# Patient Record
Sex: FEMALE | Race: BLACK OR AFRICAN AMERICAN | NOT HISPANIC OR LATINO
[De-identification: names, ages, dates, MRNs, and addresses within clinical notes are randomized per-mention and may not be internally consistent; named-entity substitution may affect disease eponyms.]

---

## 2017-02-07 ENCOUNTER — APPOINTMENT (OUTPATIENT)
Dept: OBGYN | Facility: CLINIC | Age: 47
End: 2017-02-07

## 2017-02-07 VITALS
SYSTOLIC BLOOD PRESSURE: 118 MMHG | HEART RATE: 102 BPM | HEIGHT: 63 IN | DIASTOLIC BLOOD PRESSURE: 83 MMHG | BODY MASS INDEX: 32.96 KG/M2 | WEIGHT: 186 LBS

## 2017-02-07 DIAGNOSIS — D21.9 BENIGN NEOPLASM OF CONNECTIVE AND OTHER SOFT TISSUE, UNSPECIFIED: ICD-10-CM

## 2017-02-08 LAB — HPV HIGH+LOW RISK DNA PNL CVX: NEGATIVE

## 2017-02-12 LAB — CYTOLOGY CVX/VAG DOC THIN PREP: NORMAL

## 2017-02-21 ENCOUNTER — APPOINTMENT (OUTPATIENT)
Dept: OBGYN | Facility: CLINIC | Age: 47
End: 2017-02-21

## 2017-02-21 VITALS
HEART RATE: 97 BPM | SYSTOLIC BLOOD PRESSURE: 142 MMHG | DIASTOLIC BLOOD PRESSURE: 91 MMHG | BODY MASS INDEX: 32.96 KG/M2 | HEIGHT: 63 IN | WEIGHT: 186 LBS

## 2017-02-23 LAB — CORE LAB BIOPSY: NORMAL

## 2017-03-08 ENCOUNTER — OUTPATIENT (OUTPATIENT)
Dept: OUTPATIENT SERVICES | Facility: HOSPITAL | Age: 47
LOS: 1 days | End: 2017-03-08
Payer: COMMERCIAL

## 2017-03-08 ENCOUNTER — APPOINTMENT (OUTPATIENT)
Dept: MAMMOGRAPHY | Facility: IMAGING CENTER | Age: 47
End: 2017-03-08

## 2017-03-08 ENCOUNTER — APPOINTMENT (OUTPATIENT)
Dept: ULTRASOUND IMAGING | Facility: IMAGING CENTER | Age: 47
End: 2017-03-08

## 2017-03-08 DIAGNOSIS — Z98.89 OTHER SPECIFIED POSTPROCEDURAL STATES: Chronic | ICD-10-CM

## 2017-03-08 DIAGNOSIS — Z00.8 ENCOUNTER FOR OTHER GENERAL EXAMINATION: ICD-10-CM

## 2017-04-13 PROCEDURE — 76642 ULTRASOUND BREAST LIMITED: CPT

## 2017-04-13 PROCEDURE — G0279: CPT

## 2017-04-13 PROCEDURE — 77065 DX MAMMO INCL CAD UNI: CPT

## 2017-09-07 ENCOUNTER — OUTPATIENT (OUTPATIENT)
Dept: OUTPATIENT SERVICES | Facility: HOSPITAL | Age: 47
LOS: 1 days | End: 2017-09-07
Payer: COMMERCIAL

## 2017-09-07 ENCOUNTER — APPOINTMENT (OUTPATIENT)
Dept: ULTRASOUND IMAGING | Facility: IMAGING CENTER | Age: 47
End: 2017-09-07
Payer: COMMERCIAL

## 2017-09-07 ENCOUNTER — APPOINTMENT (OUTPATIENT)
Dept: MAMMOGRAPHY | Facility: IMAGING CENTER | Age: 47
End: 2017-09-07
Payer: COMMERCIAL

## 2017-09-07 DIAGNOSIS — Z98.89 OTHER SPECIFIED POSTPROCEDURAL STATES: Chronic | ICD-10-CM

## 2017-09-07 DIAGNOSIS — Z00.8 ENCOUNTER FOR OTHER GENERAL EXAMINATION: ICD-10-CM

## 2017-09-07 PROCEDURE — 77066 DX MAMMO INCL CAD BI: CPT

## 2017-09-07 PROCEDURE — 76641 ULTRASOUND BREAST COMPLETE: CPT | Mod: 26,50

## 2017-09-07 PROCEDURE — 76641 ULTRASOUND BREAST COMPLETE: CPT

## 2017-09-07 PROCEDURE — G0279: CPT | Mod: 26

## 2017-09-07 PROCEDURE — G0204: CPT | Mod: 26

## 2017-09-07 PROCEDURE — G0279: CPT

## 2017-09-26 ENCOUNTER — APPOINTMENT (OUTPATIENT)
Dept: ULTRASOUND IMAGING | Facility: IMAGING CENTER | Age: 47
End: 2017-09-26

## 2017-09-26 ENCOUNTER — APPOINTMENT (OUTPATIENT)
Dept: MAMMOGRAPHY | Facility: IMAGING CENTER | Age: 47
End: 2017-09-26

## 2018-04-12 ENCOUNTER — APPOINTMENT (OUTPATIENT)
Dept: OBGYN | Facility: CLINIC | Age: 48
End: 2018-04-12
Payer: COMMERCIAL

## 2018-04-12 VITALS
SYSTOLIC BLOOD PRESSURE: 116 MMHG | WEIGHT: 180 LBS | HEART RATE: 86 BPM | HEIGHT: 63 IN | DIASTOLIC BLOOD PRESSURE: 76 MMHG | BODY MASS INDEX: 31.89 KG/M2 | OXYGEN SATURATION: 98 %

## 2018-04-12 DIAGNOSIS — R03.0 ELEVATED BLOOD-PRESSURE READING, W/OUT DIAGNOSIS OF HYPERTENSION: ICD-10-CM

## 2018-04-12 LAB
HCG UR QL: NEGATIVE
QUALITY CONTROL: YES

## 2018-04-12 PROCEDURE — 99396 PREV VISIT EST AGE 40-64: CPT

## 2018-04-13 LAB — HPV HIGH+LOW RISK DNA PNL CVX: NOT DETECTED

## 2018-04-16 LAB — CYTOLOGY CVX/VAG DOC THIN PREP: NORMAL

## 2018-06-02 ENCOUNTER — EMERGENCY (EMERGENCY)
Facility: HOSPITAL | Age: 48
LOS: 1 days | Discharge: HOME | End: 2018-06-02
Attending: EMERGENCY MEDICINE | Admitting: EMERGENCY MEDICINE
Payer: COMMERCIAL

## 2018-06-02 VITALS
RESPIRATION RATE: 16 BRPM | TEMPERATURE: 98 F | DIASTOLIC BLOOD PRESSURE: 86 MMHG | SYSTOLIC BLOOD PRESSURE: 134 MMHG | HEART RATE: 95 BPM | OXYGEN SATURATION: 100 %

## 2018-06-02 DIAGNOSIS — Z98.89 OTHER SPECIFIED POSTPROCEDURAL STATES: Chronic | ICD-10-CM

## 2018-06-02 PROCEDURE — 99284 EMERGENCY DEPT VISIT MOD MDM: CPT

## 2018-06-02 PROCEDURE — 70450 CT HEAD/BRAIN W/O DYE: CPT | Mod: 26

## 2018-06-02 RX ORDER — ACETAMINOPHEN 500 MG
650 TABLET ORAL ONCE
Qty: 0 | Refills: 0 | Status: COMPLETED | OUTPATIENT
Start: 2018-06-02 | End: 2018-06-02

## 2018-06-02 RX ADMIN — Medication 650 MILLIGRAM(S): at 15:46

## 2018-06-02 NOTE — ED PROVIDER NOTE - PSH
Ectopic Pregnancy  Left Salpingectomy 2007  H/O arthroscopic knee surgery    History of Dilatation and Curettage  x 3  S/P Breast Biopsy, Bilateral    S/P Breast Lumpectomy  left 2009

## 2018-06-02 NOTE — ED ADULT TRIAGE NOTE - CHIEF COMPLAINT QUOTE
states" I got hurt from  yesterday on [address] while at work." c/o swelling to left fore head. denies LOC. c/o pain to head. 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 denies use of blood thinners. takes baby ASA daily

## 2018-06-02 NOTE — ED PROVIDER NOTE - ENMT, MLM
Airway patent, Nasal mucosa clear. Mouth with normal mucosa. Throat has no vesicles, no oropharyngeal exudates and uvula is midline. No facial tenderness

## 2018-06-02 NOTE — ED PROVIDER NOTE - MEDICAL DECISION MAKING DETAILS
Based on exam and hx do not suspect serious intercranial hemorrhage or injury. Likely concussed but pt very worried. Will get CT scan. Pt has irregular periods last period in February. UCG and give tylenol

## 2018-06-02 NOTE — ED PROVIDER NOTE - PMH
Ectopic Pregnancy    HTN (Hypertension)    Hyperaldosteronism    Hypothyroidism    Malignant neoplasm of left female breast, unspecified site of breast  2009, s/p chemo, radiation, surgery - on tamoxifen  Obesity    Polyp of Corpus Uteri

## 2018-09-10 ENCOUNTER — OUTPATIENT (OUTPATIENT)
Dept: OUTPATIENT SERVICES | Facility: HOSPITAL | Age: 48
LOS: 1 days | End: 2018-09-10
Payer: COMMERCIAL

## 2018-09-10 ENCOUNTER — APPOINTMENT (OUTPATIENT)
Dept: MAMMOGRAPHY | Facility: IMAGING CENTER | Age: 48
End: 2018-09-10
Payer: COMMERCIAL

## 2018-09-10 ENCOUNTER — APPOINTMENT (OUTPATIENT)
Dept: ULTRASOUND IMAGING | Facility: IMAGING CENTER | Age: 48
End: 2018-09-10
Payer: COMMERCIAL

## 2018-09-10 DIAGNOSIS — Z98.89 OTHER SPECIFIED POSTPROCEDURAL STATES: Chronic | ICD-10-CM

## 2018-09-10 DIAGNOSIS — Z00.8 ENCOUNTER FOR OTHER GENERAL EXAMINATION: ICD-10-CM

## 2018-09-10 PROCEDURE — 77063 BREAST TOMOSYNTHESIS BI: CPT | Mod: 26

## 2018-09-10 PROCEDURE — 76641 ULTRASOUND BREAST COMPLETE: CPT | Mod: 26,50

## 2018-09-10 PROCEDURE — 77063 BREAST TOMOSYNTHESIS BI: CPT

## 2018-09-10 PROCEDURE — 77067 SCR MAMMO BI INCL CAD: CPT

## 2018-09-10 PROCEDURE — 77067 SCR MAMMO BI INCL CAD: CPT | Mod: 26

## 2018-09-10 PROCEDURE — 76641 ULTRASOUND BREAST COMPLETE: CPT

## 2019-04-12 ENCOUNTER — APPOINTMENT (OUTPATIENT)
Dept: OBGYN | Facility: CLINIC | Age: 49
End: 2019-04-12
Payer: COMMERCIAL

## 2019-04-12 VITALS
HEIGHT: 63 IN | DIASTOLIC BLOOD PRESSURE: 70 MMHG | OXYGEN SATURATION: 98 % | BODY MASS INDEX: 30.48 KG/M2 | SYSTOLIC BLOOD PRESSURE: 114 MMHG | WEIGHT: 172 LBS | HEART RATE: 86 BPM

## 2019-04-12 DIAGNOSIS — R92.2 INCONCLUSIVE MAMMOGRAM: ICD-10-CM

## 2019-04-12 DIAGNOSIS — Z12.31 ENCOUNTER FOR SCREENING MAMMOGRAM FOR MALIGNANT NEOPLASM OF BREAST: ICD-10-CM

## 2019-04-12 DIAGNOSIS — Z12.11 ENCOUNTER FOR SCREENING FOR MALIGNANT NEOPLASM OF COLON: ICD-10-CM

## 2019-04-12 PROCEDURE — 99396 PREV VISIT EST AGE 40-64: CPT

## 2019-04-12 NOTE — HISTORY OF PRESENT ILLNESS
[Good] : being in good health [HPV Vaccine NA Due to Age] : HPV vaccine not available to patient due to age [Perimenopausal] : is perimenopausal

## 2019-04-12 NOTE — PHYSICAL EXAM
[Awake] : awake [Alert] : alert [Acute Distress] : no acute distress [Mass] : no breast mass [Nipple Discharge] : no nipple discharge [Soft] : soft [Axillary LAD] : no axillary lymphadenopathy [Tender] : non tender [Oriented x3] : oriented to person, place, and time [Normal] : cervix [No Bleeding] : there was no active vaginal bleeding [Uterine Adnexae] : were not tender and not enlarged

## 2019-04-14 LAB — HPV HIGH+LOW RISK DNA PNL CVX: NOT DETECTED

## 2019-04-16 LAB — CYTOLOGY CVX/VAG DOC THIN PREP: NORMAL

## 2019-04-29 ENCOUNTER — OTHER (OUTPATIENT)
Age: 49
End: 2019-04-29

## 2019-06-19 ENCOUNTER — EMERGENCY (EMERGENCY)
Facility: HOSPITAL | Age: 49
LOS: 1 days | Discharge: HOME | End: 2019-06-19
Admitting: EMERGENCY MEDICINE
Payer: COMMERCIAL

## 2019-06-19 VITALS
OXYGEN SATURATION: 100 % | SYSTOLIC BLOOD PRESSURE: 122 MMHG | TEMPERATURE: 98 F | RESPIRATION RATE: 18 BRPM | HEART RATE: 80 BPM | DIASTOLIC BLOOD PRESSURE: 76 MMHG

## 2019-06-19 DIAGNOSIS — Z98.89 OTHER SPECIFIED POSTPROCEDURAL STATES: Chronic | ICD-10-CM

## 2019-06-19 LAB
ALBUMIN SERPL ELPH-MCNC: 4.2 G/DL — SIGNIFICANT CHANGE UP (ref 3.3–5)
ALP SERPL-CCNC: 82 U/L — SIGNIFICANT CHANGE UP (ref 40–120)
ALT FLD-CCNC: 17 U/L — SIGNIFICANT CHANGE UP (ref 4–33)
ANION GAP SERPL CALC-SCNC: 12 MMO/L — SIGNIFICANT CHANGE UP (ref 7–14)
AST SERPL-CCNC: 30 U/L — SIGNIFICANT CHANGE UP (ref 4–32)
BASOPHILS # BLD AUTO: 0.04 K/UL — SIGNIFICANT CHANGE UP (ref 0–0.2)
BASOPHILS NFR BLD AUTO: 0.7 % — SIGNIFICANT CHANGE UP (ref 0–2)
BILIRUB SERPL-MCNC: 0.6 MG/DL — SIGNIFICANT CHANGE UP (ref 0.2–1.2)
BUN SERPL-MCNC: 17 MG/DL — SIGNIFICANT CHANGE UP (ref 7–23)
CALCIUM SERPL-MCNC: 9.9 MG/DL — SIGNIFICANT CHANGE UP (ref 8.4–10.5)
CHLORIDE SERPL-SCNC: 100 MMOL/L — SIGNIFICANT CHANGE UP (ref 98–107)
CO2 SERPL-SCNC: 27 MMOL/L — SIGNIFICANT CHANGE UP (ref 22–31)
CREAT SERPL-MCNC: 0.97 MG/DL — SIGNIFICANT CHANGE UP (ref 0.5–1.3)
EOSINOPHIL # BLD AUTO: 0.22 K/UL — SIGNIFICANT CHANGE UP (ref 0–0.5)
EOSINOPHIL NFR BLD AUTO: 3.6 % — SIGNIFICANT CHANGE UP (ref 0–6)
GLUCOSE SERPL-MCNC: 105 MG/DL — HIGH (ref 70–99)
HCT VFR BLD CALC: 38.6 % — SIGNIFICANT CHANGE UP (ref 34.5–45)
HGB BLD-MCNC: 13 G/DL — SIGNIFICANT CHANGE UP (ref 11.5–15.5)
IMM GRANULOCYTES NFR BLD AUTO: 0.2 % — SIGNIFICANT CHANGE UP (ref 0–1.5)
LIDOCAIN IGE QN: 35.4 U/L — SIGNIFICANT CHANGE UP (ref 7–60)
LYMPHOCYTES # BLD AUTO: 3.28 K/UL — SIGNIFICANT CHANGE UP (ref 1–3.3)
LYMPHOCYTES # BLD AUTO: 54.2 % — HIGH (ref 13–44)
MCHC RBC-ENTMCNC: 32.7 PG — SIGNIFICANT CHANGE UP (ref 27–34)
MCHC RBC-ENTMCNC: 33.7 % — SIGNIFICANT CHANGE UP (ref 32–36)
MCV RBC AUTO: 97 FL — SIGNIFICANT CHANGE UP (ref 80–100)
MONOCYTES # BLD AUTO: 0.49 K/UL — SIGNIFICANT CHANGE UP (ref 0–0.9)
MONOCYTES NFR BLD AUTO: 8.1 % — SIGNIFICANT CHANGE UP (ref 2–14)
NEUTROPHILS # BLD AUTO: 2.01 K/UL — SIGNIFICANT CHANGE UP (ref 1.8–7.4)
NEUTROPHILS NFR BLD AUTO: 33.2 % — LOW (ref 43–77)
NRBC # FLD: 0 K/UL — SIGNIFICANT CHANGE UP (ref 0–0)
PLATELET # BLD AUTO: 285 K/UL — SIGNIFICANT CHANGE UP (ref 150–400)
PMV BLD: 9.1 FL — SIGNIFICANT CHANGE UP (ref 7–13)
POTASSIUM SERPL-MCNC: 3.7 MMOL/L — SIGNIFICANT CHANGE UP (ref 3.5–5.3)
POTASSIUM SERPL-SCNC: 3.7 MMOL/L — SIGNIFICANT CHANGE UP (ref 3.5–5.3)
PROT SERPL-MCNC: 7.4 G/DL — SIGNIFICANT CHANGE UP (ref 6–8.3)
RBC # BLD: 3.98 M/UL — SIGNIFICANT CHANGE UP (ref 3.8–5.2)
RBC # FLD: 12 % — SIGNIFICANT CHANGE UP (ref 10.3–14.5)
SODIUM SERPL-SCNC: 139 MMOL/L — SIGNIFICANT CHANGE UP (ref 135–145)
WBC # BLD: 6.05 K/UL — SIGNIFICANT CHANGE UP (ref 3.8–10.5)
WBC # FLD AUTO: 6.05 K/UL — SIGNIFICANT CHANGE UP (ref 3.8–10.5)

## 2019-06-19 PROCEDURE — 99284 EMERGENCY DEPT VISIT MOD MDM: CPT

## 2019-06-19 RX ORDER — FAMOTIDINE 10 MG/ML
20 INJECTION INTRAVENOUS ONCE
Refills: 0 | Status: COMPLETED | OUTPATIENT
Start: 2019-06-19 | End: 2019-06-19

## 2019-06-19 RX ORDER — SODIUM CHLORIDE 9 MG/ML
1000 INJECTION INTRAMUSCULAR; INTRAVENOUS; SUBCUTANEOUS ONCE
Refills: 0 | Status: COMPLETED | OUTPATIENT
Start: 2019-06-19 | End: 2019-06-19

## 2019-06-19 RX ORDER — ONDANSETRON 8 MG/1
4 TABLET, FILM COATED ORAL ONCE
Refills: 0 | Status: COMPLETED | OUTPATIENT
Start: 2019-06-19 | End: 2019-06-19

## 2019-06-19 RX ADMIN — SODIUM CHLORIDE 1000 MILLILITER(S): 9 INJECTION INTRAMUSCULAR; INTRAVENOUS; SUBCUTANEOUS at 16:35

## 2019-06-19 RX ADMIN — FAMOTIDINE 20 MILLIGRAM(S): 10 INJECTION INTRAVENOUS at 16:36

## 2019-06-19 RX ADMIN — ONDANSETRON 4 MILLIGRAM(S): 8 TABLET, FILM COATED ORAL at 16:35

## 2019-06-19 NOTE — ED ADULT TRIAGE NOTE - CHIEF COMPLAINT QUOTE
Pt co nausea since last night no vomiting. Pt also denies any abdominal pain or chest pain. finger stick 108 in triage.

## 2019-06-19 NOTE — ED ADULT NURSE REASSESSMENT NOTE - NS ED NURSE REASSESS COMMENT FT1
Awake and alert, IV placed, labs sent, medicated as ordered. Patient with nausea, no vomiting, no abdominal pain.

## 2019-06-19 NOTE — ED PROVIDER NOTE - CLINICAL SUMMARY MEDICAL DECISION MAKING FREE TEXT BOX
50 y/o female c/o nausea and vomiting x 2 days  -possible gastroenteritis vs gastritis   -labs, iv fluids, gi meds  -reassess

## 2019-06-29 NOTE — ED ADULT TRIAGE NOTE - BP NONINVASIVE DIASTOLIC (MM HG)
[facility]  Vascular Neurology  Comprehensive Stroke Center  Progress Note    Assessment/Plan:     * Thrombotic stroke involving right middle cerebral artery  55 y.o. female with significant past medical history of DM, HTN, HLD, left PCA territory infarcts last April with residual mild R sided weakness and dysarthria, presents to the ED with complains of worsening speech since around 3 pm yesterday and increasing R leg weakness that became apparent today and interferes with her ability to ambulate with her walker.    MRI with scattered foci within the right hemisphere primarily R centrum semi ovale extending to the basal ganglia w/ punctate foci in the L hemisphere. On exam left upper extremity pronator drift, left leg drift, and dysarthria. Left sided weakness more notable than right sided weakness. Etiology likely small vessel disease in this patient with multiple stroke risk factors.     Antithrombotics for secondary stroke prevention: Antiplatelets: Aspirin: 81 mg daily  Clopidogrel: 75 mg daily X 30 days     Statins for secondary stroke prevention and hyperlipidemia, if present:   Statins: Atorvastatin- 40 mg daily    Aggressive risk factor modification: HTN, Smoking, HLD, drug abuse     Rehab efforts: The patient has been evaluated by a stroke team provider and the therapy needs have been fully considered based off the presenting complaints and exam findings. The following therapy evaluations are needed: PT evaluate and treat, OT evaluate and treat, SLP evaluate and treat, PM&R evaluate for appropriate placement    Diagnostics ordered/pending: None     VTE prophylaxis: Heparin 5000 units SQ every 8 hours    BP parameters: Infarct: SBP <180        NICOLASA (acute kidney injury)  NICOLASA on admission   Initial Cr 4.2   Remains on fluids  Resolving, Cr 1.3 on 6/29    Cytotoxic cerebral edema  Area of cytotoxic cerebral edema identified when reviewing brain imaging in the territory of the R middle  cerebral artery. There is not mass effect associated with it. We will continue to monitor the patients clinical exam for any worsening of symptoms which may indicate expansion of the stroke or the area of the edema resulting in the clinical change. The pattern is suggestive of small vessel etiology      Cocaine abuse  Stroke risk factor  Tox screen last admission positive for cocaine - resources given at discharge  Pt states she no longer uses  Tox screen negative this admission     Mixed hyperlipidemia  Stroke risk factor  LDL 84.6  Continue Atorvastatin 40 mg   Per chart review questionable medication compliance     Essential hypertension  Stroke risk factor   SBP <180   BP at goal    Uncontrolled type 2 diabetes mellitus with hyperglycemia  Stroke risk factor  Hgb A1C > 14  Endocrine consult, appreciate assistance  Currently on transition insulin gtt           6/28 - Patient with generalized weakness all extremities; decreased left hand  strength. Hgb A1C >14. Endocrine consulted and patient placed on Insulin gtt. Creatinine trending down 4.2 -->3.4 - continuing IV fluids.   6/29 - NICOLASA improving, remains on insulin gtt    STROKE DOCUMENTATION   Acute Stroke Times   Last Known Normal Date: 06/26/19  Last Known Normal Time: 1500  Symptom Onset Date: 06/26/19  Symptom Onset Time: 1500  Stroke Team Called Date: 06/27/19  Stroke Team Called Time: 1520  Stroke Team Arrival Date: 06/27/19  Stroke Team Arrival Time: 1530  CT Interpretation Time: (CT pending)  Decision to Treat Time for Alteplase: (No iv alteplase candidate)  Decision to Treat Time for IR: (No IR candidate)    NIH Scale:  1a. Level of Consciousness: 0-->Alert, keenly responsive  1b. LOC Questions: 0-->Answers both questions correctly  1c. LOC Commands: 0-->Performs both tasks correctly  2. Best Gaze: 0-->Normal  3. Visual: 0-->No visual loss  4. Facial Palsy: 1-->Minor paralysis (flattened nasolabial fold, asymmetry on smiling)  5a. Motor Arm, Left:  1-->Drift, limb holds 90 (or 45) degrees, but drifts down before full 10 seconds, does not hit bed or other support  5b. Motor Arm, Right: 0-->No drift, limb holds 90 (or 45) degrees for full 10 secs  6a. Motor Leg, Left: 1-->Drift, leg falls by the end of the 5-sec period but does not hit bed  6b. Motor Leg, Right: 0-->No drift, leg holds 30 degree position for full 5 secs  7. Limb Ataxia: 0-->Absent  8. Sensory: 0-->Normal, no sensory loss  9. Best Language: 0-->No aphasia, normal  10. Dysarthria: 1-->Mild-to-moderate dysarthria, patient slurs at least some words and, at worst, can be understood with some difficulty  11. Extinction and Inattention (formerly Neglect): 0-->No abnormality  Total (NIH Stroke Scale): 4       Modified Geronimo Score: 3  Firth Coma Scale:    ABCD2 Score:    [id number] Score:   HAS -BLED Score:   ICH Score:   Hunt & Mueller Classification:      Hemorrhagic change of an Ischemic Stroke: Does this patient have an ischemic stroke with hemorrhagic changes? No     Neurologic Chief Complaint: right sided weakness     Subjective:     Interval History: Patient is seen for follow-up neurological assessment and treatment recommendations: BENIEON, no new complaints    HPI, Past Medical, Family, and Social History remains the same as documented in the initial encounter.     Review of Systems   Constitutional: Negative for fever.   Respiratory: Negative for shortness of breath.    Cardiovascular: Negative for chest pain.   Gastrointestinal: Negative for nausea and vomiting.   Neurological: Positive for speech difficulty and weakness.     Scheduled Meds:   aspirin  81 mg Oral Daily    atorvastatin  40 mg Oral Daily    clopidogrel  75 mg Oral Daily    insulin aspart U-100  10 Units Subcutaneous TIDWM    nicotine  1 patch Transdermal Daily     Continuous Infusions:   sodium chloride 0.9% 75 mL/hr at 06/29/19 0825    insulin (HUMAN R) infusion (adults) 1.5 Units/hr (06/29/19 1011)    sodium chloride  0.9%       PRN Meds:Dextrose 10% Bolus, Dextrose 10% Bolus, glucagon (human recombinant), glucose, glucose, insulin aspart U-100, sodium chloride 0.9%, sodium chloride 0.9%    Objective:     Vital Signs (Most Recent):  Temp: 97.8 °F (36.6 °C) (06/29/19 0756)  Pulse: 76 (06/29/19 0756)  Resp: 18 (06/29/19 0756)  BP: 130/66 (06/29/19 0756)  SpO2: 96 % (06/29/19 0756)  BP Location: Right arm    Vital Signs Range (Last 24H):  Temp:  [97.2 °F (36.2 °C)-98.6 °F (37 °C)]   Pulse:  [65-88]   Resp:  [18-20]   BP: (126-154)/(66-94)   SpO2:  [93 %-96 %]   BP Location: Right arm    Physical Exam   Constitutional: She is oriented to person, place, and time. She appears well-developed and well-nourished. No distress.   HENT:   Head: Normocephalic and atraumatic.   Eyes: Pupils are equal, round, and reactive to light. EOM are normal.   Cardiovascular: Normal rate.   Pulmonary/Chest: Effort normal.   Neurological: She is alert and oriented to person, place, and time.   Skin: Skin is warm and dry.   Vitals reviewed.      Neurological Exam:   LOC: alert  Attention Span: Good   Language: No aphasia  Articulation: Dysarthria  Orientation: Person, Place, Time   Visual Fields: Full  EOM (CN III, IV, VI): Full/intact  Pupils (CN II, III): PERRL  Facial Movement (CN VII): Lower facial weakness on the Left  Motor: Arm left  Paresis: 4/5  Leg left  Paresis: 4/5  Arm right  Normal 5/5  Leg right Normal 5/5  Cebellar: No evidence of appendicular or axial ataxia  Sensation: Intact to light touch, temperature and vibration    Laboratory:  CMP:   Recent Labs   Lab 06/29/19  0656   CALCIUM 10.1   ALBUMIN 3.0*   PROT 6.3      K 3.8   CO2 29      BUN 17   CREATININE 1.3   ALKPHOS 80   ALT 12   AST 18   BILITOT 0.3     BMP:   Recent Labs   Lab 06/29/19  0656      K 3.8      CO2 29   BUN 17   CREATININE 1.3   CALCIUM 10.1     CBC:   Recent Labs   Lab 06/29/19  0656   WBC 7.05   RBC 4.51   HGB 12.9   HCT 37.7      MCV 84    MCH 28.6   MCHC 34.2     Lipid Panel:   Recent Labs   Lab 06/27/19  1520   CHOL 176   LDLCALC 84.6   HDL 56   TRIG 177*     Coagulation:   Recent Labs   Lab 06/28/19  0608   INR 0.9   APTT 21.7     Platelet Aggregation Study: No results for input(s): PLTAGG, PLTAGINTERP, PLTAGREGLACO, ADPPLTAGGREG in the last 168 hours.  Hgb A1C:   Recent Labs   Lab 06/27/19  1520   HGBA1C >14.0*     TSH:   Recent Labs   Lab 06/27/19  1520   TSH 0.908       Diagnostic Results     Brain Imaging   MRI brain 6/28  Interval development of scattered foci of new signal abnormality most compatible with acute/recent infarcts as detailed above.  Largest focus in the right centrum semiovale extending to the basal ganglia with punctate foci within the left centrum semiovale and left posterior temporal occipital periventricular white matter.    Probable evolving infarcts bilateral basal ganglia with superimposed T2 flair signal abnormality elsewhere supratentorial white matter and vega suggestive for underlying chronic microvascular ischemic change.    Small remote left cerebellar infarction again seen.    No evidence for hemorrhagic conversion or hydrocephalus.  Clinical correlation and follow-up advised.      Vessel Imaging   MRA H/N  Significantly limited examination secondary to patient motion artifact.  No significant arterial abnormalities within the confines of the study.  Please consider CTA of the head and neck for further evaluation.    Cardiac Imaging   Echo 4/2/19 (previous admission)  · Normal left ventricular systolic function. The estimated ejection fraction is 68%  · Concentric left ventricular remodeling.  · Normal LV diastolic function.  · No wall motion abnormalities.  · Normal right ventricular systolic function.  · Mild mitral sclerosis.  · Normal central venous pressure (3 mm Hg).  · The estimated PA systolic pressure is 15 mm Hg                 Elvia Vann PA-C  Comprehensive Stroke Center  Department of Vascular  Neurology   [facility]       76

## 2019-09-16 ENCOUNTER — OUTPATIENT (OUTPATIENT)
Dept: OUTPATIENT SERVICES | Facility: HOSPITAL | Age: 49
LOS: 1 days | End: 2019-09-16
Payer: COMMERCIAL

## 2019-09-16 ENCOUNTER — APPOINTMENT (OUTPATIENT)
Dept: MAMMOGRAPHY | Facility: IMAGING CENTER | Age: 49
End: 2019-09-16
Payer: COMMERCIAL

## 2019-09-16 ENCOUNTER — APPOINTMENT (OUTPATIENT)
Dept: ULTRASOUND IMAGING | Facility: IMAGING CENTER | Age: 49
End: 2019-09-16
Payer: COMMERCIAL

## 2019-09-16 DIAGNOSIS — Z98.89 OTHER SPECIFIED POSTPROCEDURAL STATES: Chronic | ICD-10-CM

## 2019-09-16 DIAGNOSIS — C50.912 MALIGNANT NEOPLASM OF UNSPECIFIED SITE OF LEFT FEMALE BREAST: ICD-10-CM

## 2019-09-16 PROCEDURE — 77063 BREAST TOMOSYNTHESIS BI: CPT

## 2019-09-16 PROCEDURE — 76641 ULTRASOUND BREAST COMPLETE: CPT

## 2019-09-16 PROCEDURE — 77063 BREAST TOMOSYNTHESIS BI: CPT | Mod: 26

## 2019-09-16 PROCEDURE — 77067 SCR MAMMO BI INCL CAD: CPT

## 2019-09-16 PROCEDURE — 76641 ULTRASOUND BREAST COMPLETE: CPT | Mod: 26,50

## 2019-09-16 PROCEDURE — 77067 SCR MAMMO BI INCL CAD: CPT | Mod: 26

## 2019-09-20 ENCOUNTER — RESULT REVIEW (OUTPATIENT)
Age: 49
End: 2019-09-20

## 2019-09-20 ENCOUNTER — OUTPATIENT (OUTPATIENT)
Dept: OUTPATIENT SERVICES | Facility: HOSPITAL | Age: 49
LOS: 1 days | End: 2019-09-20
Payer: COMMERCIAL

## 2019-09-20 ENCOUNTER — APPOINTMENT (OUTPATIENT)
Dept: MAMMOGRAPHY | Facility: IMAGING CENTER | Age: 49
End: 2019-09-20
Payer: COMMERCIAL

## 2019-09-20 DIAGNOSIS — R92.8 OTHER ABNORMAL AND INCONCLUSIVE FINDINGS ON DIAGNOSTIC IMAGING OF BREAST: ICD-10-CM

## 2019-09-20 DIAGNOSIS — Z98.89 OTHER SPECIFIED POSTPROCEDURAL STATES: Chronic | ICD-10-CM

## 2019-09-20 PROCEDURE — 19082 BX BREAST ADD LESION STRTCTC: CPT

## 2019-09-20 PROCEDURE — 88305 TISSUE EXAM BY PATHOLOGIST: CPT | Mod: 26

## 2019-09-20 PROCEDURE — 19082 BX BREAST ADD LESION STRTCTC: CPT | Mod: RT

## 2019-09-20 PROCEDURE — A4648: CPT

## 2019-09-20 PROCEDURE — 19081 BX BREAST 1ST LESION STRTCTC: CPT | Mod: RT

## 2019-09-20 PROCEDURE — 77065 DX MAMMO INCL CAD UNI: CPT

## 2019-09-20 PROCEDURE — 77065 DX MAMMO INCL CAD UNI: CPT | Mod: 26,RT

## 2019-09-20 PROCEDURE — 19081 BX BREAST 1ST LESION STRTCTC: CPT

## 2019-09-20 PROCEDURE — 88305 TISSUE EXAM BY PATHOLOGIST: CPT

## 2020-01-12 NOTE — ED PROVIDER NOTE - OBJECTIVE STATEMENT
Speech Therapy  Clinical Swallow Evaluation    RECOMMENDATIONS:   Swallow Recommendations: Dysphagia treatment  Diet Solids Recommendation: Dysphagia 2/ground minced  Diet Liquids Recommendations: Nectar thick liquids  Recommended Medication Administration: Whole, Crushed, With liquid, With puree(as best tolerated)  Feeding Guidelines: Feeding Guidelines: Small bites/sips, Eat slowly, only when alert, Sit fully upright for all PO intake, Sit up for 30 minutes after meal, Constant supervision, Periodic liquid wash, Oral cares after all meals    Recommendations discussed with RN who was informed of diet modifications and who was informed of results of session     ASSESSMENT:   The patient was seen on ICU for clinical swallow evaluation. Pt has history of stroke in September of 2019 and was treated in [facility] IRP unit in to October. Pt was discharged from IRP on a general diet and nectar thick liquids and was on a thin water protocol. Pt then resided at [facility] and was seen as an outpatient at this facility for a VFSS on 11/27/19, at which time pt was on a puree diet and honey thick liquids (downgrade likely made at [facility] due to re-occuring pneumonia). Results from VFSS recommended puree diet with honey thick liquids with continued treatment from Speech Therapy for dysphagia and to upgrade diet as appropriate. Pt was most recently admitted to the ED of this facility on 1/9/20 due to low blood pressure during dialysis appointment (dialysis was not initiated and pt was sent via EMS to the ED). Current diet is puree and nectar thick liquids. Pt currently presents with mild oral/pharyngeal dysphagia as seen in clinical swallow evaluation completed this date. Pt given thin and nectar thick liquids via cup, pureed and general solids. Oral phase is characterized by reduced bolus control and suspected premature spillage with liquids (thin more so than nectar), min oral residue with general solids.  Pharyngeal phase is characterized by delayed swallow, reduced hyolaryngeal elevation, and multiple swallows noted with liquid boluses. No overt ss penetration or aspiration of note across consistencies. Pt does have significant cough at baseline, but this was noted before any po trials were given. Recommending that pt upgrade to ground/minced dysphagia 2 diet with continued nectar thick liquids. Recommending constant supervision to ensure tolerance and safety with new diet, and to assist with feeding as needed (per RN pt is requiring assist with feeding at this time). The patient now presents with mild impairments in swallowing which impact safe and efficient eating/drinking. Patient is currently functioning at moderate assist  for identified needs. Further skilled speech therapy services are reasonable and necessary to address the above impairments and performance deficits     BASELINE DIET:  Feeds Self: Yes  Liquids: Thin  Solids : General  Previous Video Swallow Studies/Interventions: Pt seen in [facility] IRP unit 10/2019 for dysphagia treatment, recommending general diet and nectar thick liquids with thin water protocol; VFSS completed 11/27/19 recommending puree and honey thick liquid with ST to treat at subacute location for potential diet upgrade  Additional Comments: Pt admittied on puree and nectar thick liquids    SUBJECTIVE/OBJECTIVE:  Clinical Swallow Eval:   Observation  Temperature Spikes Noted: No  Lines/Devices: O2  Vision: Functional for self-feeding  Patient Positioning: Upright in bed  Pre-trial Vocal Quality: Weak  Volitional Swallow: Present  Observation Comments: O2 stats dropped from 94 to 88 when mask is removed; cough at baseline  Subjective  Subjective Comments: pt alert and cooperative  Thin  Presentation: Cup  Quantity: 3 sips  Oral Phase: Premature spillage suspected; Impaired bolus control  Pharyngeal Phase: Delayed swallow;Decreased hyolaryngeal elevation;Multiple swallows noted  Comments: no overt ss penetration or aspiration, however pt needs cues to slow rate and take small sips; reduced bolus control of note  Nectar Thick  Presentation: Cup  Quantity: 4 sips  Oral Phase: Impaired bolus control;Premature spillage suspected  Pharyngeal Phase: Delayed swallow;Decreased hyolaryngeal elevation  Comments: no overt ss penetration or aspiration; improved bolus control as compared to thin liquids  Dysphagia 1/Puree  Quantity: 2 tsp applesauce  Oral Phase: Intact  Pharyngeal Phase: Delayed swallow  Comments: no overt ss penetration or aspiration  General Consistency  Quantity: 1 bite theresa cracker  Oral Phase: Slow/weak mastication;Oral residue(min oral residue)  Pharyngeal Phase: Delayed swallow  Comments: no overt ss penetration or aspiration  Esophageal Phase  Esophageal Phase: Esophageal symptoms noted  Aspiration Risk  Risk for Aspiration: Mild    EDUCATION:   On this date, the patient was educated on SLP role, swallow recommendations and guidelines, plan of care.     The response to education was: Verbalizes understanding and Needs reinforcement    GOALS:  SLP Goals  Short Term Goals to be Reviewed on : 01/19/20  STG are the Same as Discharge Goals: Yes  Goal Agreement: Patient agrees with goals and treatment plan  Swallowing Short Term Goal 1  Swallowing Discharge Goal 1: pt will tolerate least restrictive diet without complication of aspiration    Plan for Next Session:  Plan for Next Session: meal check    Frequency:  Frequency: 0 of 5 by 1/19/20    Barriers to Discharge:   SLP Identified Barriers to Discharge: medical status    Recommendations for Discharge:  Recommendations for Discharge: SLP: likely post-acute ST to follow (01/12/20 0398) 48y F with hx of HTN and breast cancer in remission 9 years presents with right sided frontal headache that radiate to right eye since yesterday after hitting head while working at [employer]. Pt was picking up tubes when she got up was hit her head on prescription printer. No LOC, nausea, or vomiting. Had worse headache overnight. Took tylenol with some relief. Pt is wearing gym gear hoping to go to gym but could not and came to ED. No weakness or numbness

## 2020-03-24 ENCOUNTER — EMERGENCY (EMERGENCY)
Facility: HOSPITAL | Age: 50
LOS: 1 days | Discharge: HOME | End: 2020-03-24
Attending: EMERGENCY MEDICINE | Admitting: EMERGENCY MEDICINE
Payer: COMMERCIAL

## 2020-03-24 VITALS
TEMPERATURE: 98 F | RESPIRATION RATE: 18 BRPM | HEART RATE: 108 BPM | DIASTOLIC BLOOD PRESSURE: 96 MMHG | OXYGEN SATURATION: 100 % | SYSTOLIC BLOOD PRESSURE: 157 MMHG

## 2020-03-24 VITALS — HEART RATE: 88 BPM | OXYGEN SATURATION: 98 %

## 2020-03-24 DIAGNOSIS — Z98.89 OTHER SPECIFIED POSTPROCEDURAL STATES: Chronic | ICD-10-CM

## 2020-03-24 LAB
ALBUMIN SERPL ELPH-MCNC: 4.4 G/DL — SIGNIFICANT CHANGE UP (ref 3.3–5)
ALP SERPL-CCNC: 87 U/L — SIGNIFICANT CHANGE UP (ref 40–120)
ALT FLD-CCNC: 23 U/L — SIGNIFICANT CHANGE UP (ref 4–33)
ANION GAP SERPL CALC-SCNC: 11 MMO/L — SIGNIFICANT CHANGE UP (ref 7–14)
AST SERPL-CCNC: 30 U/L — SIGNIFICANT CHANGE UP (ref 4–32)
BASOPHILS # BLD AUTO: 0.03 K/UL — SIGNIFICANT CHANGE UP (ref 0–0.2)
BASOPHILS NFR BLD AUTO: 0.6 % — SIGNIFICANT CHANGE UP (ref 0–2)
BILIRUB SERPL-MCNC: 0.6 MG/DL — SIGNIFICANT CHANGE UP (ref 0.2–1.2)
BUN SERPL-MCNC: 12 MG/DL — SIGNIFICANT CHANGE UP (ref 7–23)
CALCIUM SERPL-MCNC: 9.6 MG/DL — SIGNIFICANT CHANGE UP (ref 8.4–10.5)
CHLORIDE SERPL-SCNC: 100 MMOL/L — SIGNIFICANT CHANGE UP (ref 98–107)
CO2 SERPL-SCNC: 26 MMOL/L — SIGNIFICANT CHANGE UP (ref 22–31)
CREAT SERPL-MCNC: 0.9 MG/DL — SIGNIFICANT CHANGE UP (ref 0.5–1.3)
EOSINOPHIL # BLD AUTO: 0.09 K/UL — SIGNIFICANT CHANGE UP (ref 0–0.5)
EOSINOPHIL NFR BLD AUTO: 1.8 % — SIGNIFICANT CHANGE UP (ref 0–6)
GLUCOSE SERPL-MCNC: 100 MG/DL — HIGH (ref 70–99)
HCT VFR BLD CALC: 38.5 % — SIGNIFICANT CHANGE UP (ref 34.5–45)
HGB BLD-MCNC: 13 G/DL — SIGNIFICANT CHANGE UP (ref 11.5–15.5)
IMM GRANULOCYTES NFR BLD AUTO: 0.2 % — SIGNIFICANT CHANGE UP (ref 0–1.5)
LYMPHOCYTES # BLD AUTO: 1.26 K/UL — SIGNIFICANT CHANGE UP (ref 1–3.3)
LYMPHOCYTES # BLD AUTO: 25.5 % — SIGNIFICANT CHANGE UP (ref 13–44)
MCHC RBC-ENTMCNC: 32 PG — SIGNIFICANT CHANGE UP (ref 27–34)
MCHC RBC-ENTMCNC: 33.8 % — SIGNIFICANT CHANGE UP (ref 32–36)
MCV RBC AUTO: 94.8 FL — SIGNIFICANT CHANGE UP (ref 80–100)
MONOCYTES # BLD AUTO: 0.65 K/UL — SIGNIFICANT CHANGE UP (ref 0–0.9)
MONOCYTES NFR BLD AUTO: 13.1 % — SIGNIFICANT CHANGE UP (ref 2–14)
NEUTROPHILS # BLD AUTO: 2.91 K/UL — SIGNIFICANT CHANGE UP (ref 1.8–7.4)
NEUTROPHILS NFR BLD AUTO: 58.8 % — SIGNIFICANT CHANGE UP (ref 43–77)
NRBC # FLD: 0 K/UL — SIGNIFICANT CHANGE UP (ref 0–0)
PLATELET # BLD AUTO: 238 K/UL — SIGNIFICANT CHANGE UP (ref 150–400)
PMV BLD: 9.2 FL — SIGNIFICANT CHANGE UP (ref 7–13)
POTASSIUM SERPL-MCNC: 3.8 MMOL/L — SIGNIFICANT CHANGE UP (ref 3.5–5.3)
POTASSIUM SERPL-SCNC: 3.8 MMOL/L — SIGNIFICANT CHANGE UP (ref 3.5–5.3)
PROT SERPL-MCNC: 7.5 G/DL — SIGNIFICANT CHANGE UP (ref 6–8.3)
RBC # BLD: 4.06 M/UL — SIGNIFICANT CHANGE UP (ref 3.8–5.2)
RBC # FLD: 12.2 % — SIGNIFICANT CHANGE UP (ref 10.3–14.5)
SODIUM SERPL-SCNC: 137 MMOL/L — SIGNIFICANT CHANGE UP (ref 135–145)
TROPONIN T, HIGH SENSITIVITY: < 6 NG/L — SIGNIFICANT CHANGE UP (ref ?–14)
WBC # BLD: 4.95 K/UL — SIGNIFICANT CHANGE UP (ref 3.8–10.5)
WBC # FLD AUTO: 4.95 K/UL — SIGNIFICANT CHANGE UP (ref 3.8–10.5)

## 2020-03-24 PROCEDURE — 71046 X-RAY EXAM CHEST 2 VIEWS: CPT | Mod: 26

## 2020-03-24 PROCEDURE — 99284 EMERGENCY DEPT VISIT MOD MDM: CPT | Mod: 25

## 2020-03-24 PROCEDURE — 93010 ELECTROCARDIOGRAM REPORT: CPT

## 2020-03-24 RX ORDER — LIDOCAINE 4 G/100G
10 CREAM TOPICAL ONCE
Refills: 0 | Status: COMPLETED | OUTPATIENT
Start: 2020-03-24 | End: 2020-03-24

## 2020-03-24 RX ADMIN — LIDOCAINE 10 MILLILITER(S): 4 CREAM TOPICAL at 14:35

## 2020-03-24 RX ADMIN — Medication 30 MILLILITER(S): at 14:35

## 2020-03-24 NOTE — ED PROVIDER NOTE - PATIENT PORTAL LINK FT
You can access the FollowMyHealth Patient Portal offered by [facility] by registering at the following website: [email or web address]. By joining [employer]’s FollowMyHealth portal, you will also be able to view your health information using other applications (apps) compatible with our system.

## 2020-03-24 NOTE — ED PROVIDER NOTE - CLINICAL SUMMARY MEDICAL DECISION MAKING FREE TEXT BOX
Katelyn Perez MD: 50yo F with PMH of breast ca s/p chemo and radiation on Tamoxifen, hypothyroidism, HTN who presents with neck and chest burning since this AM. Pt hemodynamically stable, afebrile. Not in respiratory distress. No s/s of allergic rxn. Low suspicion for ACS. Possible fume exposure vs sensitivity to mask. Will check labs, CXR, EKG, symptomatic control and reassess

## 2020-03-24 NOTE — ED PROVIDER NOTE - ATTENDING CONTRIBUTION TO CARE
Seen and examined, pt. with non-exertional chest burning, assoc. with transient SOB, no N/V/diaphoresis, occurred prev. when entered OR, again occurred today after putting on mask and coming to work. No recent illness, no fever/chills, no cough at home. Pt. denies hx of asthma but has long hx of allergy. No hx of PNA or lung dz. MMM, clear lungs, heart reg, abd soft, NT to palp, no edema, NT calves.

## 2020-03-24 NOTE — ED PROVIDER NOTE - OBJECTIVE STATEMENT
Katelyn Perez MD: 48yo F with PMH of breast ca s/p chemo and radiation on Tamoxifen, hypothyroidism, HTN who presents with neck and chest burning since this AM. 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 No rash, cough, SOB, diaphoresis, N/V, abdominal pain, syncope, lightheadedness, irregular rhythm, palpitations, leg swelling or focal neuro deficit. NO cardiac hx. No recent travel.

## 2020-03-24 NOTE — ED ADULT NURSE REASSESSMENT NOTE - NS ED NURSE REASSESS COMMENT FT1
#20 angiocath placed into                   #20 angio placed into RAC labs sent. Received po meds as ordered. Pt states she feels better after above.

## 2020-03-24 NOTE — ED PROVIDER NOTE - PHYSICAL EXAMINATION
CONSTITUTIONAL: Nontoxic, well nourished, well developed, middle-aged female, resting comfortably in no acute distress  HEAD: Normocephalic; atraumatic  EYES: Normal inspection, EOMI  ENMT: External appears normal; normal oropharynx  NECK: Supple; non-tender; no cervical lymphadenopathy  CARD: RRR; no audible murmurs, rubs, or gallops  RESP: No respiratory distress, lungs ctab/l, no stridor   ABD: Soft, non-distended; non-tender; no rebound or guarding  EXT: No LE pitting edema or calf tenderness; distal pulses intact with good capillary refill  SKIN: Warm, dry, intact, no urticaria   NEURO: aaox3, moving all extremities spontaneously

## 2020-03-24 NOTE — ED PROVIDER NOTE - NSFOLLOWUPINSTRUCTIONS_ED_ALL_ED_FT
-Follow up with your primary care provider in 24-48 hours.   -A copy of resulted labs, imaging, and findings have been provided to you.   -As we discussed, please return to the Emergency Department if you experience any new/worsening symptoms, including, but are not limited to: unrelenting nausea, vomiting, fever, worsening chest pain, shortness of breath, rash or any other concerning symptoms.  -If you have issues obtaining follow up, please call: [phone] to obtain a doctor or specialist who takes your insurance in your area.  You may call [phone] to make an appointment with the internal medicine clinic.

## 2020-04-25 ENCOUNTER — MESSAGE (OUTPATIENT)
Age: 50
End: 2020-04-25

## 2020-06-29 ENCOUNTER — APPOINTMENT (OUTPATIENT)
Dept: OBGYN | Facility: CLINIC | Age: 50
End: 2020-06-29
Payer: SELF-PAY

## 2020-06-29 VITALS
HEART RATE: 97 BPM | BODY MASS INDEX: 31.36 KG/M2 | HEIGHT: 63 IN | WEIGHT: 177 LBS | SYSTOLIC BLOOD PRESSURE: 142 MMHG | DIASTOLIC BLOOD PRESSURE: 94 MMHG

## 2020-06-29 PROCEDURE — 99396 PREV VISIT EST AGE 40-64: CPT

## 2020-06-29 RX ORDER — FLUTICASONE PROPIONATE 50 UG/1
50 SPRAY, METERED NASAL
Qty: 16 | Refills: 0 | Status: ACTIVE | COMMUNITY
Start: 2020-03-11

## 2020-06-29 RX ORDER — AZITHROMYCIN 250 MG/1
250 TABLET, FILM COATED ORAL
Qty: 6 | Refills: 0 | Status: ACTIVE | COMMUNITY
Start: 2020-01-16

## 2020-06-29 RX ORDER — AMOXICILLIN AND CLAVULANATE POTASSIUM 875; 125 MG/1; MG/1
875-125 TABLET, COATED ORAL
Qty: 14 | Refills: 0 | Status: ACTIVE | COMMUNITY
Start: 2020-02-24

## 2020-06-29 RX ORDER — PROMETHAZINE HYDROCHLORIDE 6.25 MG/5ML
6.25 SOLUTION ORAL
Qty: 300 | Refills: 0 | Status: ACTIVE | COMMUNITY
Start: 2020-02-24

## 2020-06-29 NOTE — HISTORY OF PRESENT ILLNESS
[Good] : being in good health [HPV Vaccine NA Due to Age] : HPV vaccine not available to patient due to age [Last Mammogram ___] : Last Mammogram was [unfilled] [Last Pap ___] : Last cervical pap smear was [unfilled] [Postmenopausal] : is postmenopausal

## 2020-07-01 LAB — HPV HIGH+LOW RISK DNA PNL CVX: NOT DETECTED

## 2020-07-06 LAB — CYTOLOGY CVX/VAG DOC THIN PREP: NORMAL

## 2020-09-14 ENCOUNTER — OUTPATIENT (OUTPATIENT)
Dept: OUTPATIENT SERVICES | Facility: HOSPITAL | Age: 50
LOS: 1 days | End: 2020-09-14
Payer: COMMERCIAL

## 2020-09-14 ENCOUNTER — APPOINTMENT (OUTPATIENT)
Dept: MAMMOGRAPHY | Facility: IMAGING CENTER | Age: 50
End: 2020-09-14
Payer: COMMERCIAL

## 2020-09-14 ENCOUNTER — APPOINTMENT (OUTPATIENT)
Dept: ULTRASOUND IMAGING | Facility: IMAGING CENTER | Age: 50
End: 2020-09-14
Payer: COMMERCIAL

## 2020-09-14 DIAGNOSIS — Z98.89 OTHER SPECIFIED POSTPROCEDURAL STATES: Chronic | ICD-10-CM

## 2020-09-14 DIAGNOSIS — Z00.8 ENCOUNTER FOR OTHER GENERAL EXAMINATION: ICD-10-CM

## 2020-09-14 PROCEDURE — 77063 BREAST TOMOSYNTHESIS BI: CPT | Mod: 26

## 2020-09-14 PROCEDURE — 76641 ULTRASOUND BREAST COMPLETE: CPT | Mod: 26,50

## 2020-09-14 PROCEDURE — 77067 SCR MAMMO BI INCL CAD: CPT | Mod: 26

## 2020-09-14 PROCEDURE — 77067 SCR MAMMO BI INCL CAD: CPT

## 2020-09-14 PROCEDURE — 76641 ULTRASOUND BREAST COMPLETE: CPT

## 2020-09-14 PROCEDURE — 77063 BREAST TOMOSYNTHESIS BI: CPT

## 2020-11-10 ENCOUNTER — EMERGENCY (EMERGENCY)
Facility: HOSPITAL | Age: 50
LOS: 1 days | Discharge: HOME | End: 2020-11-10
Attending: STUDENT IN AN ORGANIZED HEALTH CARE EDUCATION/TRAINING PROGRAM | Admitting: STUDENT IN AN ORGANIZED HEALTH CARE EDUCATION/TRAINING PROGRAM
Payer: OTHER MISCELLANEOUS

## 2020-11-10 VITALS
HEIGHT: 62.5 IN | OXYGEN SATURATION: 100 % | RESPIRATION RATE: 18 BRPM | DIASTOLIC BLOOD PRESSURE: 83 MMHG | HEART RATE: 74 BPM | SYSTOLIC BLOOD PRESSURE: 127 MMHG | TEMPERATURE: 98 F

## 2020-11-10 DIAGNOSIS — Z98.89 OTHER SPECIFIED POSTPROCEDURAL STATES: Chronic | ICD-10-CM

## 2020-11-10 PROCEDURE — 99282 EMERGENCY DEPT VISIT SF MDM: CPT

## 2020-11-10 NOTE — ED PROVIDER NOTE - PATIENT PORTAL LINK FT
You can access the FollowMyHealth Patient Portal offered by [facility] by registering at the following website: [email or web address]. By joining [employer]’s FollowMyHealth portal, you will also be able to view your health information using other applications (apps) compatible with our system.

## 2020-11-10 NOTE — ED ADULT NURSE NOTE - OBJECTIVE STATEMENT
Receive pt. in Intake 10c alert and oriented x 4, presenting to the ER with complaints of left eye irritation and discomfort. Pt. stated " I was walking in the basement today at work at [employer] and water from the pipe in the ceiling fell in my left eye". No c/o pain , left eye with redness, no change in vision, labs sent for occupational exposure.

## 2020-11-10 NOTE — ED PROVIDER NOTE - PROGRESS NOTE DETAILS
BETH Wang: eye stained with fluorescin and examined with woods lamp, no evidence of abrasion. Will d/c home with S follow up patient called back after discharge to alert that she found out the water that dripped in her eye was from air-conditioning ducting, not waste water.  Pt advised to still follow up with S for lab results.  - Tiki Recinos DO

## 2020-11-10 NOTE — ED ADULT TRIAGE NOTE - CHIEF COMPLAINT QUOTE
pt c/o "I was in the basement and water fell from the ceiling into my eyes".  pt c/o pressure to her left eye.  pt had her eyes flushed in the OR prior to arrival to ED

## 2020-11-10 NOTE — ED PROVIDER NOTE - CLINICAL SUMMARY MEDICAL DECISION MAKING FREE TEXT BOX
50F p/w left eye irritation after water form the ceiling dripped in her eye.  Pt is [employer] employee here, went to OR and flushed out the eye but now has foreign bogy sensation.  Denies vision changes.  Eye lash seen and removed, however will fluorescein stain to eval for corneal abrasion.  Given unknown source of fluid that dripped in pt's eye, will fill out body fluid exposure packet and have patient follow up with [facility].  Pt does not want PEP at this time.  - Tiki Recinos, DO

## 2020-11-10 NOTE — ED PROVIDER NOTE - NSFOLLOWUPINSTRUCTIONS_ED_ALL_ED_FT
Follow up with [facility] in 2-3 days [phone]. Discuss your last tetanus shot and if you are due for a booster  Return to the ER for any persistent/worsening or new symptoms such as fever, chills, redness, pus, drainage, vision changes or ANY new concerning symptoms.

## 2020-12-18 ENCOUNTER — EMERGENCY (EMERGENCY)
Facility: HOSPITAL | Age: 50
LOS: 1 days | Discharge: HOME | End: 2020-12-18
Attending: EMERGENCY MEDICINE | Admitting: EMERGENCY MEDICINE
Payer: OTHER MISCELLANEOUS

## 2020-12-18 VITALS
TEMPERATURE: 98 F | HEIGHT: 62.5 IN | RESPIRATION RATE: 16 BRPM | HEART RATE: 75 BPM | OXYGEN SATURATION: 100 % | SYSTOLIC BLOOD PRESSURE: 140 MMHG | DIASTOLIC BLOOD PRESSURE: 94 MMHG

## 2020-12-18 DIAGNOSIS — Z98.89 OTHER SPECIFIED POSTPROCEDURAL STATES: Chronic | ICD-10-CM

## 2020-12-18 PROCEDURE — 73110 X-RAY EXAM OF WRIST: CPT | Mod: 26,LT

## 2020-12-18 PROCEDURE — 99283 EMERGENCY DEPT VISIT LOW MDM: CPT

## 2020-12-18 PROCEDURE — 73030 X-RAY EXAM OF SHOULDER: CPT | Mod: 26,LT

## 2020-12-18 RX ORDER — ACETAMINOPHEN 500 MG
650 TABLET ORAL ONCE
Refills: 0 | Status: COMPLETED | OUTPATIENT
Start: 2020-12-18 | End: 2020-12-18

## 2020-12-18 NOTE — ED ADULT NURSE NOTE - OBJECTIVE STATEMENT
pt a&o x3 s/p mechanical slip and fall this AM c/o left wrist, shoulder and lower back pain.  Pt is a [employer] Employee and states she slipped in the Employee garage. no head trauma, loc, weakness, dizziness, chest pain.  Pt ambulating steadily. nad noted. angel hammer assessed.

## 2020-12-18 NOTE — ED PROVIDER NOTE - PATIENT PORTAL LINK FT
You can access the FollowMyHealth Patient Portal offered by [facility] by registering at the following website: [email or web address]. By joining [employer]’s FollowMyHealth portal, you will also be able to view your health information using other applications (apps) compatible with our system.

## 2020-12-18 NOTE — ED PROVIDER NOTE - PROGRESS NOTE DETAILS
BETH Navarro: pt refused to wait for x-ray reports requesting to be discharged.  Discharge reviewed and d/w patient.  Pt ambulatory without difficulty.

## 2020-12-18 NOTE — ED ADULT TRIAGE NOTE - CHIEF COMPLAINT QUOTE
c/o left wrist, left posterior neck, left hip, left posterior thigh pain s/p slip and fall in employee parking garage. Hx left breast cancer with lumpectomy bilateral sides.

## 2020-12-18 NOTE — ED PROVIDER NOTE - NSFOLLOWUPINSTRUCTIONS_ED_ALL_ED_FT
Follow up with your Primary Medical Doctor in 1-2 days.  Follow up with Orthopedics in 1-2 days see attached list.  Take Tylenol 650mg orally every 6 hours as needed for pain.  Ice to affected area.  Return to the ER for any persistent/worsening or new symptoms weakness, numbness, tingling or any concerning symptoms.

## 2020-12-18 NOTE — ED PROVIDER NOTE - CLINICAL SUMMARY MEDICAL DECISION MAKING FREE TEXT BOX
51 y/o female with a hx of Left breast CA presents to the ER s/p mechanical slip and fall this AM c/o left wrist, left shoulder and left lower back pain.  Pt is well appearing, NAD, exam consistent with MSK pain, will obtain xrays, pain control, follow up PMD/Ortho.

## 2020-12-18 NOTE — ED PROVIDER NOTE - ATTENDING CONTRIBUTION TO CARE
I performed a face-to-face evaluation of the patient and performed a history and physical examination. I agree with the history and physical examination.    Slip and fall today. L shoulder and wrist pain. No clinical e/o fx. Xray. Pain meds.

## 2020-12-18 NOTE — ED PROVIDER NOTE - PHYSICAL EXAMINATION
LUE: NV intact, +TTP at anterior and posterior aspect of shoulder with FROM no swelling no deformity.  Left wrist: NV intact, mild diffuse TTP no swelling, no deformity, FROM.  Spine non tender.  Mild TTP left sided paraspinal lumbar region no swelling, no ecchymosis, no deformity.   Normal gait.

## 2020-12-18 NOTE — ED PROVIDER NOTE - WET READ LAUNCH FT
Telephone Encounter by Sue Crum at 09/12/18 05:40 PM     Author:  Ramsey Jane [facility] Service:  (none) Author Type:  Certified Medical Assistant     Filed:  09/12/18 05:41 PM Encounter Date:  9/11/2018 Status:  Signed     :  Charly Aguiar (Certified Medical Assistant)            Called insurance verbal given to pharmacist   They will try to obtain a re-write to get the Humira pen sent to patient   Patient notified thru My chart[LM1.1M]       Revision History        User Key Date/Time User Provider Type Action    > LM1.1 09/12/18 05:41 PM Sue Crum Certified Medical Assistant Sign    M - Manual There are no Wet Read(s) to document.

## 2020-12-18 NOTE — ED PROVIDER NOTE - OBJECTIVE STATEMENT
51 y/o female with a hx of Left breast CA presents to the ER s/p mechanical slip and fall this AM c/o left wrist, left shoulder and left lower back pain.  Pt is [employer] Employee and states she slipped in the Employee garage.  Pt denies head trauma, loc, weakness, dizziness, chest pain.  Pt able to ambulate without difficulty.

## 2020-12-28 ENCOUNTER — APPOINTMENT (OUTPATIENT)
Dept: ORTHOPEDIC SURGERY | Facility: CLINIC | Age: 50
End: 2020-12-28
Payer: OTHER MISCELLANEOUS

## 2020-12-28 VITALS
BODY MASS INDEX: 31.18 KG/M2 | WEIGHT: 176 LBS | HEIGHT: 63 IN | HEART RATE: 86 BPM | DIASTOLIC BLOOD PRESSURE: 71 MMHG | OXYGEN SATURATION: 97 % | SYSTOLIC BLOOD PRESSURE: 121 MMHG

## 2020-12-28 PROCEDURE — 99204 OFFICE O/P NEW MOD 45 MIN: CPT

## 2020-12-28 PROCEDURE — 72110 X-RAY EXAM L-2 SPINE 4/>VWS: CPT

## 2020-12-28 PROCEDURE — 99072 ADDL SUPL MATRL&STAF TM PHE: CPT

## 2020-12-28 RX ORDER — TIZANIDINE 2 MG/1
2 TABLET ORAL EVERY 6 HOURS
Qty: 24 | Refills: 0 | Status: ACTIVE | COMMUNITY
Start: 2020-12-28 | End: 1900-01-01

## 2020-12-28 NOTE — PHYSICAL EXAM
[de-identified] : Lumbar Physical Exam\par \par Gait -antalgic\par \par Station -normal\par \par Sagittal balance -normal\par \par Compensatory mechanism? -None\par \par Heel walk -difficult to do\par \par Toe walk -difficult to do\par \par Reflexes\par Patellar -normal\par Gastroc -normal\par Clonus -no\par \par Hip Exam -normal\par \par Straight leg raise -positive on left\par \par Pulses -2+ DP/PT\par \par Range of motion -globally reduced\par \par Sensation \par Sensation intact to light touch in L1, L2, L3, L4, L5 and S1 dermatomes bilaterally\par \par Motor\par 	IP	Quad	HS	TA	Gastroc	EHL\par Right	5/5	5/5	5/5	5/5	5/5	5/5\par Left	4/5	5/5	5/5	4/5	5/5	5/5\par \par  [de-identified] : Lumbar radiographs\par Lumbar lordosis maintained\par L4-L5 spondylolisthesis noted\par I do note instability  flexion-extension radiographs

## 2020-12-28 NOTE — HISTORY OF PRESENT ILLNESS
[de-identified] : This is a 50-year-old female that comes in today complaining of significant back and left leg pain.  The symptoms have been ongoing for the past 3 to 4 years but have acutely worsened over the past week.  She describes low back pain as well as pain that radiates down into her posterior buttock, posterior thigh into her calf.  Although she denies any weakness these symptoms have interfered with her ability to perform her activities of daily living.  To date she has tried laying flat, resting, activity modifications, Tylenol/NSAIDs without significant pain relief.

## 2020-12-28 NOTE — ASSESSMENT
[FreeTextEntry1] : I had a lengthy discussion with the patient in terms of her treatment plan today.  She does have significant back and radicular type symptoms.  Furthermore she does have some instability on flexion-extension radiographs and weakness on her left leg.  Specifically in her iliopsoas and today.  As result I would like to proceed with a lumbar MRI.  She should also begin formal goal-directed physical therapy.  An order for this is been placed today she should also take Tylenol and ibuprofen as needed for pain relief.  I will prescribe her a short course of tizanidine as well to help with her symptoms.  I will see her back in 3 to 4 weeks for repeat clinical evaluation and go over imaging.  She knows to call back sooner if her symptoms worsen or change in any way.

## 2021-01-12 ENCOUNTER — NON-APPOINTMENT (OUTPATIENT)
Age: 51
End: 2021-01-12

## 2021-01-22 ENCOUNTER — APPOINTMENT (OUTPATIENT)
Dept: ORTHOPEDIC SURGERY | Facility: CLINIC | Age: 51
End: 2021-01-22
Payer: OTHER MISCELLANEOUS

## 2021-01-22 VITALS — TEMPERATURE: 96.7 F

## 2021-01-22 PROCEDURE — 99214 OFFICE O/P EST MOD 30 MIN: CPT

## 2021-01-22 PROCEDURE — 99072 ADDL SUPL MATRL&STAF TM PHE: CPT

## 2021-01-24 NOTE — ASSESSMENT
[FreeTextEntry1] : I discussed various options in terms of treatment for the patient.  At this point she would like to hold off on any aggressive treatment.  Instead she would like to continue with physical therapy.  I do think that this is absolutely reasonable.  I will have her return in approximately 6 weeks.  Encouraged her to follow-up sooner if her symptoms worsen or change in any way.\par \par Approximately 40 minutes of time was spent in care of this patient

## 2021-01-24 NOTE — PHYSICAL EXAM
[de-identified] : Lumbar radiographs\par Lumbar lordosis maintained\par L4-L5 spondylolisthesis noted\par I do note instability  flexion-extension radiographs\par \par MRI reviewed\par L4-L5 spondylolisthesis noted\par There is canal stenosis but this not severe. [de-identified] : Lumbar Physical Exam\par \par Gait -antalgic\par \par Station -normal\par \par Sagittal balance -normal\par \par Compensatory mechanism? -None\par \par Heel walk -difficult to do\par \par Toe walk -difficult to do\par \par Reflexes\par Patellar -normal\par Gastroc -normal\par Clonus -no\par \par Hip Exam -normal\par \par Straight leg raise -positive on left\par \par Pulses -2+ DP/PT\par \par Range of motion -globally reduced\par \par Sensation \par Sensation intact to light touch in L1, L2, L3, L4, L5 and S1 dermatomes bilaterally\par \par Motor\par 	IP	Quad	HS	TA	Gastroc	EHL\par Right	5/5	5/5	5/5	5/5	5/5	5/5\par Left	5/5	5/5	5/5	5/5	5/5	5/5\par \par Improved strength today, but largely unchanged exam

## 2021-01-24 NOTE — HISTORY OF PRESENT ILLNESS
[de-identified] : This is a 50-year-old female that comes in today complaining of significant back and left leg pain.  The symptoms have been ongoing for the past 3 to 4 years but have acutely worsened.  The patient states that since his last visit he has had improvement in symptoms of his low back pain.  He still has pain that goes down his right leg.  He goes down to his back of his foot.  Standing or sitting too long does make his pain worse.  He does feel better with physical therapy.  Currently he has no appetite for an epidural steroid injection.

## 2021-03-01 ENCOUNTER — APPOINTMENT (OUTPATIENT)
Dept: ORTHOPEDIC SURGERY | Facility: CLINIC | Age: 51
End: 2021-03-01

## 2021-03-15 ENCOUNTER — APPOINTMENT (OUTPATIENT)
Dept: ORTHOPEDIC SURGERY | Facility: CLINIC | Age: 51
End: 2021-03-15
Payer: OTHER MISCELLANEOUS

## 2021-03-15 VITALS
SYSTOLIC BLOOD PRESSURE: 140 MMHG | HEIGHT: 63 IN | WEIGHT: 175 LBS | DIASTOLIC BLOOD PRESSURE: 90 MMHG | HEART RATE: 75 BPM | BODY MASS INDEX: 31.01 KG/M2

## 2021-03-15 PROCEDURE — 99214 OFFICE O/P EST MOD 30 MIN: CPT

## 2021-03-15 PROCEDURE — 99072 ADDL SUPL MATRL&STAF TM PHE: CPT

## 2021-03-15 NOTE — PHYSICAL EXAM
[de-identified] : Lumbar Physical Exam\par \par Gait - normal, improved\par \par Station -normal\par \par Sagittal balance -normal\par \par Compensatory mechanism? -None\par \par Heel walk - normal\par \par Toe walk -normal\par \par Reflexes\par Patellar -normal\par Gastroc -normal\par Clonus -no\par \par Hip Exam -normal\par \par Straight leg raise -negative\par \par Pulses -2+ DP/PT\par \par Range of motion -globally reduced\par \par Sensation \par Sensation intact to light touch in L1, L2, L3, L4, L5 and S1 dermatomes bilaterally\par \par Motor\par 	IP	Quad	HS	TA	Gastroc	EHL\par Right	5/5	5/5	5/5	5/5	5/5	5/5\par Left	5/5	5/5	5/5	5/5	5/5	5/5\par \par Improved strength and improved gait [de-identified] : Lumbar radiographs\par Lumbar lordosis maintained\par L4-L5 spondylolisthesis noted\par I do note instability  flexion-extension radiographs\par \par MRI reviewed\par L4-L5 spondylolisthesis noted\par There is canal stenosis but this not severe.

## 2021-03-15 NOTE — HISTORY OF PRESENT ILLNESS
[de-identified] : This is a 50-year-old female that comes in today complaining of significant back and left leg pain.  The symptoms have been ongoing for the past 3 to 4 years but have acutely worsened.  The patient states that since his last visit he has had improvement in symptoms of his low back pain.  He still has pain that goes down his right leg.  He goes down to his back of his foot.  Standing or sitting too long does make his pain worse.  He does feel better with physical therapy.  Currently he has no appetite for an epidural steroid injection.\par \par Above history is from the patient's last visit.\par \par Since her last visit the patient has had continued improvement in her back and leg pain.  She is very pleased with progress in PT.  She denies any focal areas of weakness, denies any focal areas of numbness or tingling.  No bowel bladder issues, no saddle anesthesia.  She does occasionally have pain that is in her back and on her left leg.  Fortunately with Tylenol his pain gets much better.

## 2021-03-15 NOTE — ASSESSMENT
[FreeTextEntry1] : I had a lengthy discussion with the patient in regards to her treatment plan diagnosis.  I think she is doing very well with conservative management.  I think she will continue to progress in the right direction with physical therapy, Tylenol/NSAIDs as well as activity modifications.  Hopefully over the next 6 to 8 weeks she can have continued improvement in her symptoms.  We will refrain from any other aggressive type treatment at this point.  I will see her back in 6 to 8 weeks for repeat clinical evaluation.  I encouraged her to follow-up sooner if she has any new or worsening symptoms.  Please note that over 30 minutes of time was spent in care of this patient which includes previsit preparation, in person visit as well as post visit documentation.

## 2021-03-20 ENCOUNTER — APPOINTMENT (OUTPATIENT)
Dept: MAMMOGRAPHY | Facility: IMAGING CENTER | Age: 51
End: 2021-03-20
Payer: COMMERCIAL

## 2021-03-20 ENCOUNTER — OUTPATIENT (OUTPATIENT)
Dept: OUTPATIENT SERVICES | Facility: HOSPITAL | Age: 51
LOS: 1 days | End: 2021-03-20
Payer: COMMERCIAL

## 2021-03-20 DIAGNOSIS — Z98.89 OTHER SPECIFIED POSTPROCEDURAL STATES: Chronic | ICD-10-CM

## 2021-03-20 DIAGNOSIS — Z00.8 ENCOUNTER FOR OTHER GENERAL EXAMINATION: ICD-10-CM

## 2021-03-20 PROCEDURE — 77065 DX MAMMO INCL CAD UNI: CPT

## 2021-03-20 PROCEDURE — 77065 DX MAMMO INCL CAD UNI: CPT | Mod: 26,RT

## 2021-04-07 ENCOUNTER — APPOINTMENT (OUTPATIENT)
Dept: ORTHOPEDIC SURGERY | Facility: CLINIC | Age: 51
End: 2021-04-07

## 2021-04-23 ENCOUNTER — APPOINTMENT (OUTPATIENT)
Dept: ORTHOPEDIC SURGERY | Facility: CLINIC | Age: 51
End: 2021-04-23
Payer: OTHER MISCELLANEOUS

## 2021-04-23 DIAGNOSIS — M54.5 LOW BACK PAIN: ICD-10-CM

## 2021-04-23 PROCEDURE — 99072 ADDL SUPL MATRL&STAF TM PHE: CPT

## 2021-04-23 PROCEDURE — 99213 OFFICE O/P EST LOW 20 MIN: CPT

## 2021-04-25 PROBLEM — M54.5 ACUTE BILATERAL LOW BACK PAIN, UNSPECIFIED WHETHER SCIATICA PRESENT: Status: ACTIVE | Noted: 2020-12-28

## 2021-04-25 NOTE — PHYSICAL EXAM
[de-identified] : Lumbar Physical Exam\par \par Gait - normal, improved\par \par Station -normal\par \par Sagittal balance -normal\par \par Compensatory mechanism? -None\par \par Heel walk - normal\par \par Toe walk -normal\par \par Reflexes\par Patellar -normal\par Gastroc -normal\par Clonus -no\par \par Hip Exam -normal\par \par Straight leg raise -negative\par \par Pulses -2+ DP/PT\par \par Range of motion -globally reduced\par \par Sensation \par Sensation intact to light touch in L1, L2, L3, L4, L5 and S1 dermatomes bilaterally\par \par Motor\par 	IP	Quad	HS	TA	Gastroc	EHL\par Right	5/5	5/5	5/5	5/5	5/5	5/5\par Left	5/5	5/5	5/5	5/5	5/5	5/5\par \par Improved strength and improved gait\par Exam continues to improve today, April 23, 2021 [de-identified] : Lumbar radiographs\par Lumbar lordosis maintained\par L4-L5 spondylolisthesis noted\par I do note instability  flexion-extension radiographs\par \par MRI reviewed\par L4-L5 spondylolisthesis noted\par There is canal stenosis but this not severe.

## 2021-04-25 NOTE — HISTORY OF PRESENT ILLNESS
[de-identified] : This is a 50-year-old female that comes in today complaining of significant back and left leg pain.  The symptoms have been ongoing for the past 3 to 4 years but have acutely worsened.  The patient states that since his last visit he has had improvement in symptoms of his low back pain.  He still has pain that goes down his right leg.  He goes down to his back of his foot.  Standing or sitting too long does make his pain worse.  He does feel better with physical therapy.  Currently he has no appetite for an epidural steroid injection.\par \par Above history is from the patient's last visit.\par \par Today the patient complains of back pain especially when there is a change in weather.  She denies any radiating pain down her legs.  She feels better when she repositions herself when she is driving.  She denies any bowel bladder issues.  She denies any saddle anesthesia.

## 2021-04-25 NOTE — ASSESSMENT
[FreeTextEntry1] : This is a 50-year-old female here today for reevaluation of her back symptoms.  Overall she feels that her symptoms have improved.  She is looking forward to getting back to work.  I have written her a letter to clear her from any restrictions at work at this time.  I will have her follow-up in approximately 3 months for repeat clinical evaluation.  I encouraged her to follow-up sooner if she has any new or worsening symptoms.

## 2021-06-02 ENCOUNTER — APPOINTMENT (OUTPATIENT)
Dept: RADIOLOGY | Facility: IMAGING CENTER | Age: 51
End: 2021-06-02
Payer: COMMERCIAL

## 2021-06-02 ENCOUNTER — OUTPATIENT (OUTPATIENT)
Dept: OUTPATIENT SERVICES | Facility: HOSPITAL | Age: 51
LOS: 1 days | End: 2021-06-02
Payer: COMMERCIAL

## 2021-06-02 ENCOUNTER — RESULT REVIEW (OUTPATIENT)
Age: 51
End: 2021-06-02

## 2021-06-02 DIAGNOSIS — Z98.89 OTHER SPECIFIED POSTPROCEDURAL STATES: Chronic | ICD-10-CM

## 2021-06-02 DIAGNOSIS — Z00.8 ENCOUNTER FOR OTHER GENERAL EXAMINATION: ICD-10-CM

## 2021-06-02 PROCEDURE — 77080 DXA BONE DENSITY AXIAL: CPT

## 2021-06-02 PROCEDURE — 77080 DXA BONE DENSITY AXIAL: CPT | Mod: 26

## 2021-07-09 ENCOUNTER — RESULT REVIEW (OUTPATIENT)
Age: 51
End: 2021-07-09

## 2021-07-23 ENCOUNTER — APPOINTMENT (OUTPATIENT)
Dept: OBGYN | Facility: CLINIC | Age: 51
End: 2021-07-23
Payer: COMMERCIAL

## 2021-07-23 VITALS
HEIGHT: 63 IN | TEMPERATURE: 97 F | WEIGHT: 180 LBS | HEART RATE: 93 BPM | DIASTOLIC BLOOD PRESSURE: 76 MMHG | SYSTOLIC BLOOD PRESSURE: 118 MMHG | BODY MASS INDEX: 31.89 KG/M2

## 2021-07-23 PROCEDURE — 99396 PREV VISIT EST AGE 40-64: CPT

## 2021-07-23 PROCEDURE — 99072 ADDL SUPL MATRL&STAF TM PHE: CPT

## 2021-07-23 NOTE — HISTORY OF PRESENT ILLNESS
[Good] : being in good health [Last Mammogram ___] : Last Mammogram was [unfilled] [Last Pap ___] : Last cervical pap smear was [unfilled] [Postmenopausal] : is postmenopausal [HPV Vaccine NA Due to Age] : HPV vaccine not available to patient due to age [Mammogramdate] : 2020 [BreastSonogramDate] : 2020 [BoneDensityDate] : 2021

## 2021-07-26 LAB — HPV HIGH+LOW RISK DNA PNL CVX: NOT DETECTED

## 2021-07-29 LAB — CYTOLOGY CVX/VAG DOC THIN PREP: NORMAL

## 2021-09-20 ENCOUNTER — APPOINTMENT (OUTPATIENT)
Dept: ULTRASOUND IMAGING | Facility: CLINIC | Age: 51
End: 2021-09-20
Payer: COMMERCIAL

## 2021-09-20 ENCOUNTER — OUTPATIENT (OUTPATIENT)
Dept: OUTPATIENT SERVICES | Facility: HOSPITAL | Age: 51
LOS: 1 days | End: 2021-09-20
Payer: COMMERCIAL

## 2021-09-20 DIAGNOSIS — Z98.89 OTHER SPECIFIED POSTPROCEDURAL STATES: Chronic | ICD-10-CM

## 2021-09-20 DIAGNOSIS — N95.0 POSTMENOPAUSAL BLEEDING: ICD-10-CM

## 2021-09-20 PROCEDURE — 76830 TRANSVAGINAL US NON-OB: CPT

## 2021-09-20 PROCEDURE — 76856 US EXAM PELVIC COMPLETE: CPT | Mod: 26

## 2021-09-20 PROCEDURE — 76856 US EXAM PELVIC COMPLETE: CPT

## 2021-09-20 PROCEDURE — 76830 TRANSVAGINAL US NON-OB: CPT | Mod: 26

## 2021-09-22 ENCOUNTER — NON-APPOINTMENT (OUTPATIENT)
Age: 51
End: 2021-09-22

## 2021-09-23 ENCOUNTER — EMERGENCY (EMERGENCY)
Facility: HOSPITAL | Age: 51
LOS: 1 days | Discharge: HOME | End: 2021-09-23
Attending: STUDENT IN AN ORGANIZED HEALTH CARE EDUCATION/TRAINING PROGRAM | Admitting: STUDENT IN AN ORGANIZED HEALTH CARE EDUCATION/TRAINING PROGRAM
Payer: COMMERCIAL

## 2021-09-23 VITALS
SYSTOLIC BLOOD PRESSURE: 168 MMHG | DIASTOLIC BLOOD PRESSURE: 88 MMHG | TEMPERATURE: 98 F | HEART RATE: 85 BPM | RESPIRATION RATE: 21 BRPM | HEIGHT: 62.5 IN | OXYGEN SATURATION: 100 %

## 2021-09-23 VITALS
OXYGEN SATURATION: 100 % | TEMPERATURE: 99 F | HEART RATE: 65 BPM | SYSTOLIC BLOOD PRESSURE: 133 MMHG | RESPIRATION RATE: 18 BRPM | DIASTOLIC BLOOD PRESSURE: 82 MMHG

## 2021-09-23 DIAGNOSIS — Z98.89 OTHER SPECIFIED POSTPROCEDURAL STATES: Chronic | ICD-10-CM

## 2021-09-23 LAB
ALBUMIN SERPL ELPH-MCNC: 4.7 G/DL — SIGNIFICANT CHANGE UP (ref 3.3–5)
ALP SERPL-CCNC: 115 U/L — SIGNIFICANT CHANGE UP (ref 40–120)
ALT FLD-CCNC: 26 U/L — SIGNIFICANT CHANGE UP (ref 4–33)
ANION GAP SERPL CALC-SCNC: 11 MMOL/L — SIGNIFICANT CHANGE UP (ref 7–14)
APPEARANCE UR: CLEAR — SIGNIFICANT CHANGE UP
AST SERPL-CCNC: 26 U/L — SIGNIFICANT CHANGE UP (ref 4–32)
BASOPHILS # BLD AUTO: 0.03 K/UL — SIGNIFICANT CHANGE UP (ref 0–0.2)
BASOPHILS NFR BLD AUTO: 0.5 % — SIGNIFICANT CHANGE UP (ref 0–2)
BILIRUB SERPL-MCNC: 1 MG/DL — SIGNIFICANT CHANGE UP (ref 0.2–1.2)
BILIRUB UR-MCNC: NEGATIVE — SIGNIFICANT CHANGE UP
BUN SERPL-MCNC: 12 MG/DL — SIGNIFICANT CHANGE UP (ref 7–23)
CALCIUM SERPL-MCNC: 10.6 MG/DL — HIGH (ref 8.4–10.5)
CHLORIDE SERPL-SCNC: 102 MMOL/L — SIGNIFICANT CHANGE UP (ref 98–107)
CO2 SERPL-SCNC: 26 MMOL/L — SIGNIFICANT CHANGE UP (ref 22–31)
COLOR SPEC: COLORLESS — SIGNIFICANT CHANGE UP
CREAT SERPL-MCNC: 0.89 MG/DL — SIGNIFICANT CHANGE UP (ref 0.5–1.3)
DIFF PNL FLD: NEGATIVE — SIGNIFICANT CHANGE UP
EOSINOPHIL # BLD AUTO: 0.13 K/UL — SIGNIFICANT CHANGE UP (ref 0–0.5)
EOSINOPHIL NFR BLD AUTO: 2.3 % — SIGNIFICANT CHANGE UP (ref 0–6)
GLUCOSE SERPL-MCNC: 96 MG/DL — SIGNIFICANT CHANGE UP (ref 70–99)
GLUCOSE UR QL: NEGATIVE — SIGNIFICANT CHANGE UP
HCT VFR BLD CALC: 40 % — SIGNIFICANT CHANGE UP (ref 34.5–45)
HGB BLD-MCNC: 13.7 G/DL — SIGNIFICANT CHANGE UP (ref 11.5–15.5)
IANC: 2.15 K/UL — SIGNIFICANT CHANGE UP (ref 1.5–8.5)
IMM GRANULOCYTES NFR BLD AUTO: 0.2 % — SIGNIFICANT CHANGE UP (ref 0–1.5)
KETONES UR-MCNC: NEGATIVE — SIGNIFICANT CHANGE UP
LEUKOCYTE ESTERASE UR-ACNC: NEGATIVE — SIGNIFICANT CHANGE UP
LIDOCAIN IGE QN: 33 U/L — SIGNIFICANT CHANGE UP (ref 7–60)
LYMPHOCYTES # BLD AUTO: 2.92 K/UL — SIGNIFICANT CHANGE UP (ref 1–3.3)
LYMPHOCYTES # BLD AUTO: 50.7 % — HIGH (ref 13–44)
MCHC RBC-ENTMCNC: 32.6 PG — SIGNIFICANT CHANGE UP (ref 27–34)
MCHC RBC-ENTMCNC: 34.3 GM/DL — SIGNIFICANT CHANGE UP (ref 32–36)
MCV RBC AUTO: 95.2 FL — SIGNIFICANT CHANGE UP (ref 80–100)
MONOCYTES # BLD AUTO: 0.52 K/UL — SIGNIFICANT CHANGE UP (ref 0–0.9)
MONOCYTES NFR BLD AUTO: 9 % — SIGNIFICANT CHANGE UP (ref 2–14)
NEUTROPHILS # BLD AUTO: 2.15 K/UL — SIGNIFICANT CHANGE UP (ref 1.8–7.4)
NEUTROPHILS NFR BLD AUTO: 37.3 % — LOW (ref 43–77)
NITRITE UR-MCNC: NEGATIVE — SIGNIFICANT CHANGE UP
NRBC # BLD: 0 /100 WBCS — SIGNIFICANT CHANGE UP
NRBC # FLD: 0 K/UL — SIGNIFICANT CHANGE UP
PH UR: 6.5 — SIGNIFICANT CHANGE UP (ref 5–8)
PLATELET # BLD AUTO: 310 K/UL — SIGNIFICANT CHANGE UP (ref 150–400)
POTASSIUM SERPL-MCNC: 4 MMOL/L — SIGNIFICANT CHANGE UP (ref 3.5–5.3)
POTASSIUM SERPL-SCNC: 4 MMOL/L — SIGNIFICANT CHANGE UP (ref 3.5–5.3)
PROT SERPL-MCNC: 8 G/DL — SIGNIFICANT CHANGE UP (ref 6–8.3)
PROT UR-MCNC: NEGATIVE — SIGNIFICANT CHANGE UP
RBC # BLD: 4.2 M/UL — SIGNIFICANT CHANGE UP (ref 3.8–5.2)
RBC # FLD: 11.9 % — SIGNIFICANT CHANGE UP (ref 10.3–14.5)
SODIUM SERPL-SCNC: 139 MMOL/L — SIGNIFICANT CHANGE UP (ref 135–145)
SP GR SPEC: 1 — SIGNIFICANT CHANGE UP (ref 1–1.05)
UROBILINOGEN FLD QL: SIGNIFICANT CHANGE UP
WBC # BLD: 5.76 K/UL — SIGNIFICANT CHANGE UP (ref 3.8–10.5)
WBC # FLD AUTO: 5.76 K/UL — SIGNIFICANT CHANGE UP (ref 3.8–10.5)

## 2021-09-23 PROCEDURE — 99284 EMERGENCY DEPT VISIT MOD MDM: CPT

## 2021-09-23 RX ORDER — ONDANSETRON 8 MG/1
4 TABLET, FILM COATED ORAL ONCE
Refills: 0 | Status: COMPLETED | OUTPATIENT
Start: 2021-09-23 | End: 2021-09-23

## 2021-09-23 RX ORDER — ONDANSETRON 8 MG/1
1 TABLET, FILM COATED ORAL
Qty: 4 | Refills: 0
Start: 2021-09-23 | End: 2021-09-24

## 2021-09-23 RX ORDER — ONDANSETRON 8 MG/1
4 TABLET, FILM COATED ORAL ONCE
Refills: 0 | Status: DISCONTINUED | OUTPATIENT
Start: 2021-09-23 | End: 2021-09-23

## 2021-09-23 RX ADMIN — ONDANSETRON 4 MILLIGRAM(S): 8 TABLET, FILM COATED ORAL at 09:09

## 2021-09-23 NOTE — ED ADULT NURSE NOTE - PRO INTERPRETER NEED 2
Received an incoming call from DHEERAJ Comer. Informed her of note from Dr. Mcdonald regarding patient referral for Urology from Dr. Martinez. Informed her per Dr. Mcdonald note she spoke to the patients father and had him send her pictures and she does not feel at this time a Urology appointment is needed. Read Nahomi complete note by Dr. Mcdonald. She is not in today but she will inform her tomorrow and if she has any questions she will reach back to us or call Dr. Mcdonald directly to discuss.    English

## 2021-09-23 NOTE — ED PROVIDER NOTE - NSICDXPASTMEDICALHX_GEN_ALL_CORE_FT
PAST MEDICAL HISTORY:  Ectopic Pregnancy     HTN (Hypertension)     Hyperaldosteronism     Hypothyroidism     Malignant neoplasm of left female breast, unspecified site of breast 2009, s/p chemo, radiation, surgery - on tamoxifen    Obesity     Polyp of Corpus Uteri

## 2021-09-23 NOTE — ED PROVIDER NOTE - ATTENDING CONTRIBUTION TO CARE
50 yo female with HTN, h/o breast ca presents to ED for evaluation of recent urinary symptoms, now with nausea and mild low back pressure sensation. Denies fevers, chills, vomiting. Denies sensorimotor deficits, bowel or bladder incontinence or retention, saddle anesthesia. PE is unremarkable. A/P Labs, medicate, reassess

## 2021-09-23 NOTE — ED PROVIDER NOTE - OBJECTIVE STATEMENT
Patient is a 51y F PMHx HTN, breast cancer, presenting today with nausea and lower back pressure. Patient went to work this morning and started to feel nauseous and had some lower back pressure. Denies SOB, CP, fever, vision changes, vomiting, diarrhea, abdominal pain.     Last week she had increased urinary frequency and had a UA/Ucx at urgent care, results pending on the culture. She also had vaginal bleeding last week, saw her obgyn, had a TVUS and pelvic exam at ob. Scheduled for biopsy Oct 6th. No bleeding since 3 days ago.

## 2021-09-23 NOTE — ED ADULT NURSE NOTE - OBJECTIVE STATEMENT
Received pt to intake 7, A+Ox4, ambulatory. C/O nausea x 4 days worsening this morning. pt denies visual changes, sensitivity to light. Respirations even and unlabored, normal work of breathing, no accessory muscle use, speaking in full clear uninterrupted sentences. ABD is soft, non tender, non distended. Pt denies any chest pain, SOB, headache, dizziness, V/D, fever, chills.  20G to RAC, Labs sent, Medicated as per MD, will continue to monitor.

## 2021-09-23 NOTE — ED ADULT NURSE NOTE - NSICDXFAMILYHX_GEN_ALL_CORE_FT
FAMILY HISTORY:  Father  Still living? Unknown  Diabetes mellitus, Age at diagnosis: Age Unknown  Hypertension, Age at diagnosis: Age Unknown    Mother  Still living? Unknown  Diabetes mellitus, Age at diagnosis: Age Unknown  Family history of CHF (congestive heart failure), Age at diagnosis: Age Unknown  Hypertension, Age at diagnosis: Age Unknown

## 2021-09-23 NOTE — ED PROVIDER NOTE - NSICDXPASTSURGICALHX_GEN_ALL_CORE_FT
PAST SURGICAL HISTORY:  Ectopic Pregnancy Left Salpingectomy 2007    H/O arthroscopic knee surgery     History of Dilatation and Curettage x 3    S/P Breast Biopsy, Bilateral     S/P Breast Lumpectomy left 2009

## 2021-09-23 NOTE — ED PROVIDER NOTE - NSFOLLOWUPINSTRUCTIONS_ED_ALL_ED_FT
You were evaluated in the Emergency Department for nausea.  You were evaluated and examined by a physician, and you had labs and a urinalysis    Based on your evaluation: your urine was normal as well as your bloodwork    There are no signs of emergency conditions requiring admission to the hospital on today's workup.  Based on the evaluation, a presumptive diagnosis was made, however, further evaluation may be required by your primary care physician or a specialist for a more definitive diagnosis.  Therefore, please follow-up as directed or return to the Emergency Department if your symptoms change or worsen.    We recommend that you:  1. See your primary care physician within the next 72 hours for follow up.  Bring a copy of your discharge paperwork (including any test results) to your doctor.  2. Zofran as needed for nausea      *** Return immediately if you have worsening symptoms, vomiting that is not relived by zofran, inability to eat/drink, significant abdominal pain, or any other new/concerning symptoms. ***

## 2021-09-23 NOTE — ED ADULT TRIAGE NOTE - CHIEF COMPLAINT QUOTE
pt presents to ED for evaluation of headache, nausea+ vomiting and dizziness. pt states she had similar symptoms x 4 days ago and was seen in urgent care but symptoms have gotten worse this morning. p

## 2021-09-23 NOTE — ED ADULT NURSE NOTE - NSSEPSISSUSPECTED_ED_A_ED
Department of Emergency Medicine  [address] [place] [address]  Provider Note  Admit Date/Time: 11/23/2020  4:02 PM  Room: 04/04  MRN: [id number]  Chief Complaint: Foot Pain (pt has pain to left great toe started x 2 days. no injury)       History of Present Illness   Source of history provided by:  patient. History/Exam Limitations: mental status. Arely Wilson is a 53 y.o. male who has a past medical history of:   Past Medical History:   Diagnosis Date    Pneumonia     presents to the urgent care center by private car for pain at the joint at the base of his left great toe that started 2 days ago. There was no injury. He said it hurts to put his shoe on it hurts to touch. He does not have a history of gout. ROS    Pertinent positives and negatives are stated within HPI, all other systems reviewed and are negative. Past Surgical History:   Procedure Laterality Date    HERNIA REPAIR      TONSILLECTOMY AND ADENOIDECTOMY      VEIN SURGERY     Social History:  reports that he has never smoked. He has never used smokeless tobacco. He reports that he does not drink alcohol or use drugs. Family History: family history is not on file. Allergies: Patient has no known allergies. Physical Exam   Oxygen Saturation Interpretation: Normal.   ED Triage Vitals [11/23/20 1605]   BP Temp Temp Source Pulse Resp SpO2 Height Weight   (!) 179/94 97.7 °F (36.5 °C) Infrared 57 16 98 % -- 260 lb (117.9 kg)       Physical Exam  · Constitutional/General: Alert and oriented x3, well appearing, non toxic in NAD  · HEENT:  NC/NT. Clear conjunctiva,    · Neck: Supple, full ROM,   · Respiratory: . Not in respiratory distress  · CV:  Regular rate. Regular rhythm. · Musculoskeletal: Moves all extremities x 4.  · Integument: skin warm and dry. No rashes.   Mild erythema at the base of the joint of the left great toe slightly tender to touch no edema other erythema of the foot  · Lymphatic: no lymphadenopathy noted  · Neurologic: GCS 15, no focal deficits,   · Psychiatric: Normal Affect    Lab / Imaging Results   (All laboratory and radiology results have been personally reviewed by myself)  Labs:  No results found for this visit on 11/23/20. Imaging: All Radiology results interpreted by Radiologist unless otherwise noted. No orders to display       ED Course / Medical Decision Making     Medications   dexamethasone (PF) (DECADRON) injection 10 mg (has no administration in time range)            MDM:   Patient here with possible gout he has never had gout before but he has some pain and erythema at the base of the great toe at the joint I did have them drawn uric acid level and advised him he will need to follow-up with his family doctor for that result. He was Given Decadron orally and if that does not relieve the pain I did order some Indocin that he can start tomorrow , heshould take it with food. Any worsening symptoms he should get reevaluated    Counseling:    I have  spoken with the patient and discussed todays results, in addition to providing specific details for the plan of care and counseling regarding the diagnosis and prognosis. Questions are answered at this time and they are agreeable with the plan. Assessment      1. Acute gout involving toe, unspecified cause, unspecified laterality      Plan   Discharge to home and advised to contact Libia Shore DO  [address] [facility] [address] [phone]    Schedule an appointment as soon as possible for a visit      Patient condition is good    New Medications     New Prescriptions    INDOMETHACIN (INDOCIN) 25 MG CAPSULE    Take 1 capsule by mouth 3 times daily (with meals) for 5 days As needed for gout pain. Start on 11/24     Electronically signed by LISSY Reyes CNP   DD: 11/23/20  **This report was transcribed using voice recognition software.  Every effort was made to ensure accuracy; however, inadvertent computerized transcription errors may be present.   END OF ED PROVIDER NOTE     Kristin Whitt, APRN - CNP  11/23/20 5505 No

## 2021-09-23 NOTE — ED PROVIDER NOTE - CLINICAL SUMMARY MEDICAL DECISION MAKING FREE TEXT BOX
Patient is a 51y F PMHx HTN, breast cancer, presenting today with nausea and lower back pressure. Will get labs, lipase, UA. Patient with good obgyn follow-up.

## 2021-09-23 NOTE — ED PROVIDER NOTE - PHYSICAL EXAMINATION
GENERAL: no acute distress, non-toxic appearing  HEAD: normocephalic  HEENT: PERRLA, EOMI, normal conjunctiva, oral mucosa moist  CARDIAC: regular rate and rhythm, normal S1 and S2, no appreciable murmurs  PULM: clear to ascultation bilaterally  GI: abdomen nondistended, soft, mild tenderness to the LLQ  : no CVA tenderness  NEURO: alert and oriented x 3, normal speech, no gross neurologic deficit  MSK: no visible deformities  SKIN: dry, well-perfused  PSYCH: appropriate mood and affect

## 2021-09-23 NOTE — ED PROVIDER NOTE - PATIENT PORTAL LINK FT
You can access the FollowMyHealth Patient Portal offered by [facility] by registering at the following website: [email or web address]. By joining [employer]’s FollowMyHealth portal, you will also be able to view your health information using other applications (apps) compatible with our system.

## 2021-09-24 LAB
CULTURE RESULTS: SIGNIFICANT CHANGE UP
SPECIMEN SOURCE: SIGNIFICANT CHANGE UP

## 2021-10-04 ENCOUNTER — APPOINTMENT (OUTPATIENT)
Dept: OBGYN | Facility: CLINIC | Age: 51
End: 2021-10-04
Payer: COMMERCIAL

## 2021-10-04 VITALS
DIASTOLIC BLOOD PRESSURE: 87 MMHG | BODY MASS INDEX: 30.65 KG/M2 | WEIGHT: 173 LBS | HEIGHT: 63 IN | HEART RATE: 102 BPM | SYSTOLIC BLOOD PRESSURE: 137 MMHG

## 2021-10-04 LAB
ESTRADIOL SERPL-MCNC: 19 PG/ML
FSH SERPL-MCNC: 51.2 IU/L
HCG SERPL-MCNC: 6 MIU/ML

## 2021-10-04 PROCEDURE — 58100 BIOPSY OF UTERUS LINING: CPT

## 2021-10-04 PROCEDURE — 99214 OFFICE O/P EST MOD 30 MIN: CPT | Mod: 25

## 2021-10-04 RX ORDER — ONDANSETRON 4 MG/1
4 TABLET ORAL
Qty: 4 | Refills: 0 | Status: ACTIVE | COMMUNITY
Start: 2021-09-23

## 2021-10-04 RX ORDER — IBUPROFEN 800 MG/1
800 TABLET, FILM COATED ORAL
Qty: 25 | Refills: 0 | Status: ACTIVE | COMMUNITY
Start: 2021-08-23

## 2021-10-04 RX ORDER — NITROFURANTOIN (MONOHYDRATE/MACROCRYSTALS) 25; 75 MG/1; MG/1
100 CAPSULE ORAL
Qty: 14 | Refills: 0 | Status: ACTIVE | COMMUNITY
Start: 2021-09-19

## 2021-10-04 RX ORDER — CHLORHEXIDINE GLUCONATE, 0.12% ORAL RINSE 1.2 MG/ML
0.12 SOLUTION DENTAL
Qty: 473 | Refills: 0 | Status: ACTIVE | COMMUNITY
Start: 2021-08-23

## 2021-10-04 RX ORDER — AMOXICILLIN 500 MG/1
500 CAPSULE ORAL
Qty: 21 | Refills: 0 | Status: ACTIVE | COMMUNITY
Start: 2021-08-23

## 2021-10-04 NOTE — PROCEDURE
[Endometrial Biopsy] : Endometrial biopsy [Time out performed] : Pre-procedure time out performed.  Patient's name, date of birth and procedure confirmed. [Consent Obtained] : Consent obtained [Post-Menop. Bleeding] : post-menopausal bleeding [Risks] : risks [Benefits] : benefits [Alternatives] : alternatives [Patient] : patient [Infection] : infection [Bleeding] : bleeding [Allergic Reaction] : allergic reaction [Uterine Perforation] : uterine perforation [Pain] : pain [Negative] : negative pregnancy test [Betadine] : Betadine [None] : none [Anteverted] : anteverted [Sent to Pathology] : placed in buffered formalin and sent for pathology [Tolerated Well] : Patient tolerated the procedure well [No Complications] : No complications [Easy Passage] : Easy passage [Scant] : scant

## 2021-10-04 NOTE — PHYSICAL EXAM
[Appropriately responsive] : appropriately responsive [Alert] : alert [No Acute Distress] : no acute distress [No Lymphadenopathy] : no lymphadenopathy [Soft] : soft [Non-tender] : non-tender [Non-distended] : non-distended [No HSM] : No HSM [No Lesions] : no lesions [No Mass] : no mass [Oriented x3] : oriented x3 [Labia Majora] : normal [Labia Minora] : normal [Normal] : normal [Uterine Adnexae] : normal [Scant] : There was scant vaginal bleeding

## 2021-10-05 ENCOUNTER — TRANSCRIPTION ENCOUNTER (OUTPATIENT)
Age: 51
End: 2021-10-05

## 2021-10-12 ENCOUNTER — OUTPATIENT (OUTPATIENT)
Dept: OUTPATIENT SERVICES | Facility: HOSPITAL | Age: 51
LOS: 1 days | End: 2021-10-12
Payer: COMMERCIAL

## 2021-10-12 ENCOUNTER — APPOINTMENT (OUTPATIENT)
Dept: ULTRASOUND IMAGING | Facility: IMAGING CENTER | Age: 51
End: 2021-10-12
Payer: COMMERCIAL

## 2021-10-12 ENCOUNTER — APPOINTMENT (OUTPATIENT)
Dept: MAMMOGRAPHY | Facility: IMAGING CENTER | Age: 51
End: 2021-10-12
Payer: COMMERCIAL

## 2021-10-12 DIAGNOSIS — Z98.89 OTHER SPECIFIED POSTPROCEDURAL STATES: Chronic | ICD-10-CM

## 2021-10-12 DIAGNOSIS — Z00.8 ENCOUNTER FOR OTHER GENERAL EXAMINATION: ICD-10-CM

## 2021-10-12 PROCEDURE — 77067 SCR MAMMO BI INCL CAD: CPT | Mod: 26

## 2021-10-12 PROCEDURE — G0279: CPT

## 2021-10-12 PROCEDURE — 76641 ULTRASOUND BREAST COMPLETE: CPT

## 2021-10-12 PROCEDURE — 77063 BREAST TOMOSYNTHESIS BI: CPT | Mod: 26

## 2021-10-12 PROCEDURE — 77063 BREAST TOMOSYNTHESIS BI: CPT

## 2021-10-12 PROCEDURE — 76641 ULTRASOUND BREAST COMPLETE: CPT | Mod: 26,50

## 2021-10-12 PROCEDURE — 77066 DX MAMMO INCL CAD BI: CPT

## 2021-10-12 PROCEDURE — 77067 SCR MAMMO BI INCL CAD: CPT

## 2021-10-13 LAB — CORE LAB BIOPSY: NORMAL

## 2022-02-07 ENCOUNTER — EMERGENCY (EMERGENCY)
Facility: HOSPITAL | Age: 52
LOS: 1 days | Discharge: HOME | End: 2022-02-07
Attending: EMERGENCY MEDICINE | Admitting: EMERGENCY MEDICINE
Payer: COMMERCIAL

## 2022-02-07 VITALS
OXYGEN SATURATION: 99 % | RESPIRATION RATE: 18 BRPM | SYSTOLIC BLOOD PRESSURE: 134 MMHG | HEIGHT: 62.5 IN | DIASTOLIC BLOOD PRESSURE: 94 MMHG | TEMPERATURE: 99 F | HEART RATE: 74 BPM

## 2022-02-07 DIAGNOSIS — Z98.89 OTHER SPECIFIED POSTPROCEDURAL STATES: Chronic | ICD-10-CM

## 2022-02-07 PROCEDURE — 99284 EMERGENCY DEPT VISIT MOD MDM: CPT

## 2022-02-07 NOTE — ED PROVIDER NOTE - ATTENDING CONTRIBUTION TO CARE
I have seen and examined the patient on the patient´s visit date. I have reviewed the note written by Bibiana Maldonado MD, on that visit day. I have supervised and participated as necessary in the performance of procedures indicated for patient management and was available at all phases of the patient´s visit when needed. We discussed the history, physical exam findings, management plan, and  medical decision making. I have made my additions, exceptions, and revisions within the chart and I agree with H and P as documented in its entirety. The data and my interpretation of any data collected from labs, interventions and imaging appear below as well as my independent medical decision making and considerations.    The patient is a 51y Obese Female who has a past medical and surgery history of  HTN hypothyroidism hypoaldosteronism ectopic w/ rt salpingectomy uterine polyp D and C knee arthroscopy and  left breast CA s/p chemo/RTx/chemo/on tamoxifen PTED with palpitations after receiving the Pfizer vaccine at noon today as described   Vital Signs   PE: as described; my additions and exceptions are noted in the chart    DATA:  EKG: Normal; NSR@ 86; no ectopy   LAB: Pending at time of evaluation            IMPRESSION/RISK:  Dx=Palpitations    Consideration include s/s have subsided  Plan  d/c with followup   RTED PRN

## 2022-02-07 NOTE — ED PROVIDER NOTE - OBJECTIVE STATEMENT
50 yo Fn hx of HTN, employee, received, Pfizer booster today at noon, has been feeling intermittent palpitations and fatigue since. Pt has normal EKG, in the ED, well appearing. Denies chest pain, sob, nausea, vomiting. 52 yo Fn hx of HTN, breast CA in remission since 2009, employee, received, Pfizer booster today at noon, has been feeling intermittent palpitations and fatigue since. Pt denied similar symptoms for both prior vaccinations, but endorse URI and viral syndrome symptoms a few weeks ago with negative COVID PCR. Pt has normal EKG, in the ED, well appearing. Denies chest pain, sob, nausea, vomiting, states she is feeling better now.

## 2022-02-07 NOTE — ED PROVIDER NOTE - TOBACCO USE
----- Message from Stefan Urena DO sent at 8/23/2019  3:52 PM CDT -----  Urinalysis shows improvement from previous. CMP levels are stable. Continue close monitoring. Unknown if ever smoked

## 2022-02-07 NOTE — ED PROVIDER NOTE - NS ED ROS FT
GENERAL: No fever, chills + fatigue  EYES: no vision changes, no discharge.   ENT: no difficulty swallowing or speaking   CARDIAC: no chest pain/pressure, SOB, lower extremity swelling + palpitations  PULMONARY: no cough, SOB  GI: no abdominal pain, n/v/d  : no dysuria, no hematuria  SKIN: no rashes, no ecchymosis  NEURO: no headache, lightheadedness  MSK: No joint pain, myalgia, weakness.

## 2022-02-07 NOTE — ED PROVIDER NOTE - NSFOLLOWUPINSTRUCTIONS_ED_ALL_ED_FT
** Stay hydrated and get lots of rest.   ** Take over the counter Tylenol or Ibuprofen for pain -- follow dosing directions on original bottle.        ** Go to the nearest Emergency Department if you experience any new or concerning symptoms, such as:   - chest pain  - difficulty breathing  - passing out  - unable to eat or drink  - unable to move or feel part of your body  - fever, chills

## 2022-02-07 NOTE — ED PROVIDER NOTE - CLINICAL SUMMARY MEDICAL DECISION MAKING FREE TEXT BOX
The patient is a 51y Obese Female who has a past medical and surgery history of  HTN hypothyroidism hypoaldosteronism ectopic w/ rt salpingectomy uterine polyp D and C knee arthroscopy and  left breast CA s/p chemo/RTx/chemo/on tamoxifen PTED with palpitations after receiving the Pfizer vaccine at noon today as described   Vital Signs   PE: as described; my additions and exceptions are noted in the chart    DATA:  EKG: Normal; NSR@ 86; no ectopy   LAB: Pending at time of evaluation    IMPRESSION/RISK:  Dx=Palpitations    Consideration include s/s have subsided  Plan  d/c with followup   RTED PRN

## 2022-02-07 NOTE — ED PROVIDER NOTE - PATIENT PORTAL LINK FT
You can access the FollowMyHealth Patient Portal offered by [facility] by registering at the following website: [email or web address]. By joining [employer]’s FollowMyHealth portal, you will also be able to view your health information using other applications (apps) compatible with our system.

## 2022-04-21 ENCOUNTER — APPOINTMENT (OUTPATIENT)
Dept: OBGYN | Facility: CLINIC | Age: 52
End: 2022-04-21
Payer: COMMERCIAL

## 2022-04-21 ENCOUNTER — ASOB RESULT (OUTPATIENT)
Age: 52
End: 2022-04-21

## 2022-04-21 LAB
ESTRADIOL SERPL-MCNC: 7 PG/ML
FSH SERPL-MCNC: 60.7 IU/L
HCG SERPL-MCNC: 8 MIU/ML

## 2022-04-21 PROCEDURE — 76857 US EXAM PELVIC LIMITED: CPT

## 2022-04-21 PROCEDURE — 76830 TRANSVAGINAL US NON-OB: CPT

## 2022-04-29 ENCOUNTER — APPOINTMENT (OUTPATIENT)
Dept: OBGYN | Facility: CLINIC | Age: 52
End: 2022-04-29

## 2022-05-05 ENCOUNTER — ASOB RESULT (OUTPATIENT)
Age: 52
End: 2022-05-05

## 2022-05-05 ENCOUNTER — APPOINTMENT (OUTPATIENT)
Dept: OBGYN | Facility: CLINIC | Age: 52
End: 2022-05-05
Payer: COMMERCIAL

## 2022-05-05 DIAGNOSIS — N95.0 POSTMENOPAUSAL BLEEDING: ICD-10-CM

## 2022-05-05 DIAGNOSIS — R93.89 ABNORMAL FINDINGS ON DIAGNOSTIC IMAGING OF OTHER SPECIFIED BODY STRUCTURES: ICD-10-CM

## 2022-05-05 DIAGNOSIS — N95.9 UNSPECIFIED MENOPAUSAL AND PERIMENOPAUSAL DISORDER: ICD-10-CM

## 2022-05-05 PROCEDURE — 76830 TRANSVAGINAL US NON-OB: CPT

## 2022-05-05 PROCEDURE — 99214 OFFICE O/P EST MOD 30 MIN: CPT | Mod: 25

## 2022-05-05 PROCEDURE — 58100 BIOPSY OF UTERUS LINING: CPT

## 2022-05-05 PROCEDURE — 58340 CATHETER FOR HYSTEROGRAPHY: CPT

## 2022-05-12 LAB — CORE LAB BIOPSY: NORMAL

## 2022-05-23 ENCOUNTER — OUTPATIENT (OUTPATIENT)
Dept: OUTPATIENT SERVICES | Facility: HOSPITAL | Age: 52
LOS: 1 days | End: 2022-05-23
Payer: COMMERCIAL

## 2022-05-23 VITALS
SYSTOLIC BLOOD PRESSURE: 118 MMHG | WEIGHT: 177.91 LBS | RESPIRATION RATE: 16 BRPM | HEIGHT: 62 IN | HEART RATE: 89 BPM | DIASTOLIC BLOOD PRESSURE: 77 MMHG | TEMPERATURE: 99 F | OXYGEN SATURATION: 99 %

## 2022-05-23 DIAGNOSIS — N84.0 POLYP OF CORPUS UTERI: ICD-10-CM

## 2022-05-23 DIAGNOSIS — Z98.89 OTHER SPECIFIED POSTPROCEDURAL STATES: Chronic | ICD-10-CM

## 2022-05-23 DIAGNOSIS — C50.919 MALIGNANT NEOPLASM OF UNSPECIFIED SITE OF UNSPECIFIED FEMALE BREAST: Chronic | ICD-10-CM

## 2022-05-23 LAB
ANION GAP SERPL CALC-SCNC: 9 MMOL/L — SIGNIFICANT CHANGE UP (ref 7–14)
BUN SERPL-MCNC: 16 MG/DL — SIGNIFICANT CHANGE UP (ref 7–23)
CALCIUM SERPL-MCNC: 9.3 MG/DL — SIGNIFICANT CHANGE UP (ref 8.4–10.5)
CHLORIDE SERPL-SCNC: 101 MMOL/L — SIGNIFICANT CHANGE UP (ref 98–107)
CO2 SERPL-SCNC: 27 MMOL/L — SIGNIFICANT CHANGE UP (ref 22–31)
CREAT SERPL-MCNC: 1.2 MG/DL — SIGNIFICANT CHANGE UP (ref 0.5–1.3)
EGFR: 54 ML/MIN/1.73M2 — LOW
GLUCOSE SERPL-MCNC: 92 MG/DL — SIGNIFICANT CHANGE UP (ref 70–99)
HCG UR QL: NEGATIVE — SIGNIFICANT CHANGE UP
HCT VFR BLD CALC: 37.1 % — SIGNIFICANT CHANGE UP (ref 34.5–45)
HGB BLD-MCNC: 12.5 G/DL — SIGNIFICANT CHANGE UP (ref 11.5–15.5)
MCHC RBC-ENTMCNC: 32.5 PG — SIGNIFICANT CHANGE UP (ref 27–34)
MCHC RBC-ENTMCNC: 33.7 GM/DL — SIGNIFICANT CHANGE UP (ref 32–36)
MCV RBC AUTO: 96.4 FL — SIGNIFICANT CHANGE UP (ref 80–100)
NRBC # BLD: 0 /100 WBCS — SIGNIFICANT CHANGE UP
NRBC # FLD: 0 K/UL — SIGNIFICANT CHANGE UP
PLATELET # BLD AUTO: 285 K/UL — SIGNIFICANT CHANGE UP (ref 150–400)
POTASSIUM SERPL-MCNC: 3.5 MMOL/L — SIGNIFICANT CHANGE UP (ref 3.5–5.3)
POTASSIUM SERPL-SCNC: 3.5 MMOL/L — SIGNIFICANT CHANGE UP (ref 3.5–5.3)
RBC # BLD: 3.85 M/UL — SIGNIFICANT CHANGE UP (ref 3.8–5.2)
RBC # FLD: 12.2 % — SIGNIFICANT CHANGE UP (ref 10.3–14.5)
SODIUM SERPL-SCNC: 137 MMOL/L — SIGNIFICANT CHANGE UP (ref 135–145)
WBC # BLD: 5.99 K/UL — SIGNIFICANT CHANGE UP (ref 3.8–10.5)
WBC # FLD AUTO: 5.99 K/UL — SIGNIFICANT CHANGE UP (ref 3.8–10.5)

## 2022-05-23 PROCEDURE — 93010 ELECTROCARDIOGRAM REPORT: CPT

## 2022-05-23 RX ORDER — AMLODIPINE BESYLATE 2.5 MG/1
0 TABLET ORAL
Qty: 0 | Refills: 0 | DISCHARGE

## 2022-05-23 NOTE — H&P PST ADULT - PROBLEM SELECTOR PLAN 1
This is a 51 y/o female who is scheduled for D&C, hysteroscopy with symphion on 6-7-22  * Instructed to speak with surgeon regarding covid testing  * Given preop instructions  * Instructed to take normal am dose of amlodipine the am of surgery

## 2022-05-23 NOTE — H&P PST ADULT - NSICDXPASTMEDICALHX_GEN_ALL_CORE_FT
PAST MEDICAL HISTORY:  Ectopic Pregnancy     HTN (Hypertension)     Hypothyroidism     Malignant neoplasm of left female breast, unspecified site of breast 2009, s/p chemo, radiation, surgery - on tamoxifen    Miscarriage x 4 (had 3 D&C's    Obesity     Polyp of Corpus Uteri

## 2022-05-23 NOTE — H&P PST ADULT - ATTENDING PHYSICIAN: I HAVE REVIEWED THE CLINICAL DOCUMENTATION AND AGREE WITH THE ABOVE NOTE
March 10, 2018      [facility] [address] [facility] [address], [address]  [place] [address]  Phone: [phone]  Fax: [phone]       Patient: Manish Pradhan   YOB: 2016  Date of Visit: 03/10/2018    To Whom It May Concern:    Maria Ines Pradhan  was at [facility] on 03/10/2018. He may return to work/school on 3/13/2018 with no restrictions. If you have any questions or concerns, or if I can be of further assistance, please do not hesitate to contact me.    Sincerely,    Carie Miller MD      Statement Selected

## 2022-05-23 NOTE — H&P PST ADULT - NS ATTEND AMEND GEN_ALL_CORE FT
PMPB and thick EL--plan and pt opts for Plvic EUA(learners may be present,D/C hysteroscopy,SYMPHION--pt aware R/B/A,side effects,comls.  Ample time for question provided

## 2022-05-23 NOTE — H&P PST ADULT - NSICDXPASTSURGICALHX_GEN_ALL_CORE_FT
PAST SURGICAL HISTORY:  Breast cancer insertion of wurrf-n-xtbh and then removed after chemotherapy treatment    Ectopic Pregnancy Left Salpingectomy 2007    H/O arthroscopic knee surgery     History of Dilatation and Curettage x 3    S/P Breast Biopsy, Bilateral     S/P Breast Lumpectomy left 2009

## 2022-05-23 NOTE — H&P PST ADULT - HISTORY OF PRESENT ILLNESS
This is a 51 y/o female who presents with metorrhagia. Visited MD with subsequent sonogram confirming uterine polyp. Further intervention recommended. Scheduled for dilation curettage hysteroscopy with symphion

## 2022-06-03 ENCOUNTER — NON-APPOINTMENT (OUTPATIENT)
Age: 52
End: 2022-06-03

## 2022-06-06 ENCOUNTER — TRANSCRIPTION ENCOUNTER (OUTPATIENT)
Age: 52
End: 2022-06-06

## 2022-06-07 ENCOUNTER — TRANSCRIPTION ENCOUNTER (OUTPATIENT)
Age: 52
End: 2022-06-07

## 2022-06-07 ENCOUNTER — RESULT REVIEW (OUTPATIENT)
Age: 52
End: 2022-06-07

## 2022-06-07 ENCOUNTER — APPOINTMENT (OUTPATIENT)
Dept: OBGYN | Facility: HOSPITAL | Age: 52
End: 2022-06-07

## 2022-06-07 ENCOUNTER — OUTPATIENT (OUTPATIENT)
Dept: OUTPATIENT SERVICES | Facility: HOSPITAL | Age: 52
LOS: 1 days | Discharge: HOME | End: 2022-06-07
Payer: COMMERCIAL

## 2022-06-07 VITALS
DIASTOLIC BLOOD PRESSURE: 90 MMHG | RESPIRATION RATE: 15 BRPM | HEART RATE: 94 BPM | TEMPERATURE: 98 F | SYSTOLIC BLOOD PRESSURE: 126 MMHG | WEIGHT: 177.91 LBS | HEIGHT: 62 IN | OXYGEN SATURATION: 98 %

## 2022-06-07 VITALS
DIASTOLIC BLOOD PRESSURE: 67 MMHG | RESPIRATION RATE: 14 BRPM | SYSTOLIC BLOOD PRESSURE: 103 MMHG | OXYGEN SATURATION: 98 % | HEART RATE: 74 BPM

## 2022-06-07 DIAGNOSIS — N84.0 POLYP OF CORPUS UTERI: ICD-10-CM

## 2022-06-07 DIAGNOSIS — Z98.89 OTHER SPECIFIED POSTPROCEDURAL STATES: Chronic | ICD-10-CM

## 2022-06-07 DIAGNOSIS — C50.919 MALIGNANT NEOPLASM OF UNSPECIFIED SITE OF UNSPECIFIED FEMALE BREAST: Chronic | ICD-10-CM

## 2022-06-07 LAB — HCG UR QL: NEGATIVE — SIGNIFICANT CHANGE UP

## 2022-06-07 PROCEDURE — 88305 TISSUE EXAM BY PATHOLOGIST: CPT | Mod: 26

## 2022-06-07 PROCEDURE — 58561 HYSTEROSCOPY REMOVE MYOMA: CPT | Mod: GC

## 2022-06-07 RX ORDER — CALCIUM CARBONATE 500(1250)
1 TABLET ORAL
Qty: 0 | Refills: 0 | DISCHARGE

## 2022-06-07 RX ORDER — SODIUM CHLORIDE 9 MG/ML
1000 INJECTION, SOLUTION INTRAVENOUS
Refills: 0 | Status: DISCONTINUED | OUTPATIENT
Start: 2022-06-07 | End: 2022-06-21

## 2022-06-07 RX ORDER — MORPHINE SULFATE 50 MG/1
4 CAPSULE, EXTENDED RELEASE ORAL
Refills: 0 | Status: DISCONTINUED | OUTPATIENT
Start: 2022-06-07 | End: 2022-06-07

## 2022-06-07 RX ORDER — FENTANYL CITRATE 50 UG/ML
50 INJECTION INTRAVENOUS
Refills: 0 | Status: DISCONTINUED | OUTPATIENT
Start: 2022-06-07 | End: 2022-06-07

## 2022-06-07 RX ORDER — ONDANSETRON 8 MG/1
4 TABLET, FILM COATED ORAL ONCE
Refills: 0 | Status: DISCONTINUED | OUTPATIENT
Start: 2022-06-07 | End: 2022-06-21

## 2022-06-07 RX ORDER — OXYCODONE HYDROCHLORIDE 5 MG/1
10 TABLET ORAL ONCE
Refills: 0 | Status: DISCONTINUED | OUTPATIENT
Start: 2022-06-07 | End: 2022-06-07

## 2022-06-07 RX ORDER — OXYCODONE HYDROCHLORIDE 5 MG/1
5 TABLET ORAL ONCE
Refills: 0 | Status: DISCONTINUED | OUTPATIENT
Start: 2022-06-07 | End: 2022-06-07

## 2022-06-07 RX ORDER — ASPIRIN/CALCIUM CARB/MAGNESIUM 324 MG
1 TABLET ORAL
Qty: 0 | Refills: 0 | DISCHARGE

## 2022-06-07 RX ORDER — AMLODIPINE BESYLATE 2.5 MG/1
1 TABLET ORAL
Qty: 0 | Refills: 0 | DISCHARGE

## 2022-06-07 RX ORDER — LEVOTHYROXINE SODIUM 125 MCG
1 TABLET ORAL
Qty: 0 | Refills: 0 | DISCHARGE

## 2022-06-07 RX ORDER — SPIRONOLACTONE 25 MG/1
0 TABLET, FILM COATED ORAL
Qty: 0 | Refills: 0 | DISCHARGE

## 2022-06-07 RX ORDER — ASCORBIC ACID 60 MG
1 TABLET,CHEWABLE ORAL
Qty: 0 | Refills: 0 | DISCHARGE

## 2022-06-07 NOTE — ASU DISCHARGE PLAN (ADULT/PEDIATRIC) - PROVIDER TOKENS
PROVIDER:[TOKEN:[[id number]],FOLLOWUP:[2 weeks],ESTABLISHEDPATIENT:[T]] PROVIDER:[TOKEN:[[id number]],SCHEDULEDAPPT:[07/20/2022],SCHEDULEDAPPTTIME:[11:00 AM],ESTABLISHEDPATIENT:[T]]

## 2022-06-07 NOTE — BRIEF OPERATIVE NOTE - NSICDXBRIEFPROCEDURE_GEN_ALL_CORE_FT
PROCEDURES:  Hysteroscopy with biopsy or polypectomy 07-Jun-2022 09:19:23  Leesa Cerda  Hysteroscopy, with dilation and curettage 07-Jun-2022 09:22:03  Leesa Cerda   PROCEDURES:  Hysteroscopy, with dilation and curettage of uterus, with polypectomy or myomectomy 07-Jun-2022 10:42:36  Leesa Cerda

## 2022-06-07 NOTE — BRIEF OPERATIVE NOTE - OPERATION/FINDINGS
EUA: Anteverted uterus approximately 6 weeks in size. External genitalia wnl   Hysteroscopy: Atrophic endometrial tissue. ~1cm polyp on L. lower uterine segment. Bilateral ostia visualized and within normal limits. EUA: Anteverted uterus approximately 6 weeks in size. External genitalia wnl   Hysteroscopy: Atrophic endometrial tissue. ~1cm intramural fibroid with submucosal involvement on L. lower uterine segment. Bilateral ostia visualized and within normal limits.

## 2022-06-07 NOTE — ASU PATIENT PROFILE, ADULT - NSICDXPASTSURGICALHX_GEN_ALL_CORE_FT
PAST SURGICAL HISTORY:  Breast cancer insertion of aigth-z-bwhe and then removed after chemotherapy treatment    Ectopic Pregnancy Left Salpingectomy 2007    H/O arthroscopic knee surgery     History of Dilatation and Curettage x 3    S/P Breast Biopsy, Bilateral     S/P Breast Lumpectomy left 2009

## 2022-06-07 NOTE — ASU DISCHARGE PLAN (ADULT/PEDIATRIC) - NURSING INSTRUCTIONS
You received IV Tylenol for pain management at 08:55am. Please DO NOT take any Tylenol (Acetaminophen) containing products, such as Vicodin, Percocet, Excedrin, and cold medications for the next 6 hours (until 02:55 PM). DO NOT TAKE MORE THAN 3000 MG OF TYLENOL in a 24 hour period.

## 2022-06-07 NOTE — ASU DISCHARGE PLAN (ADULT/PEDIATRIC) - NS MD DC FALL RISK RISK
For information on Fall & Injury Prevention, visit: [email or web address] OR  [email or web address] OR  https://www.cdc.gov/steadi/patient.html

## 2022-06-15 LAB — SURGICAL PATHOLOGY STUDY: SIGNIFICANT CHANGE UP

## 2022-07-20 ENCOUNTER — APPOINTMENT (OUTPATIENT)
Dept: OBGYN | Facility: CLINIC | Age: 52
End: 2022-07-20

## 2022-07-22 ENCOUNTER — EMERGENCY (EMERGENCY)
Facility: HOSPITAL | Age: 52
LOS: 1 days | Discharge: HOME | End: 2022-07-22
Attending: STUDENT IN AN ORGANIZED HEALTH CARE EDUCATION/TRAINING PROGRAM | Admitting: STUDENT IN AN ORGANIZED HEALTH CARE EDUCATION/TRAINING PROGRAM

## 2022-07-22 VITALS
RESPIRATION RATE: 18 BRPM | TEMPERATURE: 98 F | DIASTOLIC BLOOD PRESSURE: 83 MMHG | SYSTOLIC BLOOD PRESSURE: 124 MMHG | OXYGEN SATURATION: 100 % | HEART RATE: 72 BPM | HEIGHT: 62 IN

## 2022-07-22 DIAGNOSIS — C50.919 MALIGNANT NEOPLASM OF UNSPECIFIED SITE OF UNSPECIFIED FEMALE BREAST: Chronic | ICD-10-CM

## 2022-07-22 DIAGNOSIS — Z98.89 OTHER SPECIFIED POSTPROCEDURAL STATES: Chronic | ICD-10-CM

## 2022-07-22 PROBLEM — O03.9 COMPLETE OR UNSPECIFIED SPONTANEOUS ABORTION WITHOUT COMPLICATION: Chronic | Status: ACTIVE | Noted: 2022-05-23

## 2022-07-22 PROCEDURE — 73110 X-RAY EXAM OF WRIST: CPT | Mod: 26,RT

## 2022-07-22 PROCEDURE — 99283 EMERGENCY DEPT VISIT LOW MDM: CPT

## 2022-07-22 PROCEDURE — 73130 X-RAY EXAM OF HAND: CPT | Mod: 26,RT

## 2022-07-22 RX ORDER — IBUPROFEN 200 MG
600 TABLET ORAL ONCE
Refills: 0 | Status: COMPLETED | OUTPATIENT
Start: 2022-07-22 | End: 2022-07-22

## 2022-07-22 RX ADMIN — Medication 600 MILLIGRAM(S): at 10:11

## 2022-07-22 NOTE — ED PROVIDER NOTE - NSICDXPASTSURGICALHX_GEN_ALL_CORE_FT
PAST SURGICAL HISTORY:  Breast cancer insertion of ysgoy-g-yhoz and then removed after chemotherapy treatment    Ectopic Pregnancy Left Salpingectomy 2007    H/O arthroscopic knee surgery     History of Dilatation and Curettage x 3    S/P Breast Biopsy, Bilateral     S/P Breast Lumpectomy left 2009

## 2022-07-22 NOTE — ED ADULT TRIAGE NOTE - CHIEF COMPLAINT QUOTE
Pt states a stapler  fell and landed on her right wrist while at work. pt with pain to wrist and fingers.

## 2022-07-22 NOTE — ED PROVIDER NOTE - PATIENT PORTAL LINK FT
You can access the FollowMyHealth Patient Portal offered by [facility] by registering at the following website: [email or web address]. By joining [employer]’s FollowMyHealth portal, you will also be able to view your health information using other applications (apps) compatible with our system.

## 2022-07-22 NOTE — ED PROVIDER NOTE - CARE PLAN
1 Principal Discharge DX:	Pain in right hand   Principal Discharge DX:	Pain in right hand  Assessment and plan of treatment:	You were evaluated for right hand pain. You had xray but left prior to imaging was read by radiology. Follow up with hand surgery within 1 week. take motrin and tylenol as needed for pain. Return to the ED if you exhibit any new, continued or worsening symptoms.

## 2022-07-22 NOTE — ED PROVIDER NOTE - CLINICAL SUMMARY MEDICAL DECISION MAKING FREE TEXT BOX
52 year old female with no known PMH presents to the ED complaining of right wrist pain today. HD stable. Imp: Right wrist contusion, r/o acute fractures. Plan for x-ray, meds, reassess.

## 2022-07-22 NOTE — ED PROVIDER NOTE - OBJECTIVE STATEMENT
52 year old female with no known PMH presents to the ED complaining of right wrist pain today. Pt states a stapler accidently fell on her right wrist, now has pain to right wrist 52 year old female with no known PMH presents to the ED complaining of right wrist pain today. Pt states a stapler accidently fell on her right wrist, now has pain to right wrist, worse with movement, took Tylenol with some relief. Denies weakness, numbness or tingling sensation. Denies any other complaints.

## 2022-07-22 NOTE — ED PROVIDER NOTE - ATTENDING APP SHARED VISIT CONTRIBUTION OF CARE
53 y/o F with PMH HTN, Breast ca s/p treatment p/w right wrist pain. 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 She states she has pain in her wrist worse with movement. She states she has pain is located on the dorsal aspect of her wrist. normal sensation, lmp 1 month ago. able to range wrist took tylenol prior to arrival.   denies fever, chills, chest pain, SOB, abdominal pain, diarrhea, dysuria, syncope, bleeding, new rash,weakness, numbness, blurred vision    ROS  otherwise negative as per HPI  Gen: Awake, Alert, WD, WN, NAD  Head:  NC/AT  Eyes:  PERRL, EOMI, Conjunctiva pink, lids normal, no scleral icterus  ENT: OP clear, no exudates, no erythema, uvula midline, TMs clear bilaterally, moist mucus membranes  Neck: supple, nontender, no meningismus, no JVD, trachea midline  Cardiac/CV:  S1 S2, RRR, no M/G/R  Respiratory/Pulm:  CTAB, good air movement, normal resp effort, no wheezes/stridor/retractions/rales/rhonchi  Gastrointestinal/Abdomen:  Soft, nontender, nondistended, +BS, no rebound/guarding  Ext:  warm, well perfused, moving all extremities spontaneously, no peripheral edema, distal pulses intact  Skin: intact, mild swelling on dorsal aspect of wrist   Neuro:  AAOx3, sensation intact, motor 5/5 x 4 extremities, normal gait, speech clear

## 2022-07-22 NOTE — ED PROVIDER NOTE - NS_EDPROVIDERDISPOUSERTYPE_ED_A_ED
Call center called with pt on the line. Pt returning Malaika's VM. Writer read pt's MyChart and assisted pt. Pt is to get an US, writer gave phone number to imaging dept and order is in. Writer could no longer offer appt due to an urgent pt at same time of call. Pt is aware of Rx and confirmed [facility] in [place] for Rx to be sent. No sooner in person appt with Dr Hawley or [place] [facility] [place] . Current appt placed on waitlist.  
Orders placed.     Malaika Mayorga RN MSN    
Attending Attestation (For Attendings USE Only)...

## 2022-07-22 NOTE — ED PROVIDER NOTE - NSFOLLOWUPINSTRUCTIONS_ED_ALL_ED_FT
Follow up with your PMD within 48-72 hrs. Show copies of your reports given to you. Take all of your medications as previously prescribed.    If you have any new, worsened or concerning symptoms, please return to the emergency department immediately. Follow up with your PMD within 48-72 hrs. Show copies of your reports given to you. Take all of your medications as previously prescribed.  You were evaluated for right hand pain. You had xray but left prior to imaging was read by radiology. Follow up with hand surgery within 1 week. take motrin and tylenol as needed for pain. Return to the ED if you exhibit any new, continued or worsening symptoms.      If you have any new, worsened or concerning symptoms, please return to the emergency department immediately.

## 2022-07-22 NOTE — ED PROVIDER NOTE - PROGRESS NOTE DETAILS
BETH Jackson: pt reassessed, states she feels better. xrays negative for acute fractures. ace wrap. pain meds. pmd follow up. Strict return precautions. Pt does not wish to wait for xrays , will d/c with hand follow up

## 2022-07-22 NOTE — ED PROVIDER NOTE - PLAN OF CARE
You were evaluated for right hand pain. You had xray but left prior to imaging was read by radiology. Follow up with hand surgery within 1 week. take motrin and tylenol as needed for pain. Return to the ED if you exhibit any new, continued or worsening symptoms.

## 2022-08-25 ENCOUNTER — APPOINTMENT (OUTPATIENT)
Dept: OBGYN | Facility: CLINIC | Age: 52
End: 2022-08-25

## 2022-09-30 DIAGNOSIS — N76.0 ACUTE VAGINITIS: ICD-10-CM

## 2022-09-30 RX ORDER — FLUCONAZOLE 150 MG/1
150 TABLET ORAL
Qty: 2 | Refills: 3 | Status: ACTIVE | COMMUNITY
Start: 2022-09-30 | End: 1900-01-01

## 2022-09-30 NOTE — H&P PST ADULT - MS EXT PE MLT D E PC
May use same day hold to see her  But I really need to see her when the arm is swollen   So she may need to call us the next time is does and at least I need to eyeball it I have never seen it   I did order an MRI for her  
Per 07-01-22 OV-  Patient Instructions   Set up PT      Xray on Tuesday      If not better in 2-3 weeks of PT would get MRI      Please call in and let me know how you are doing     Pt discharged from out pt PT- low back pain. States has been doing home exercises and has returned to [employer] but having persistent LBP. Asking of needs to F/U with PCP or order MRI?    Also reports intermittent episodes rt forearm swelling, last eval per 0902-22 UC visit. Treated with kefelx and prednisone with sx improvement. Continues some mild intermittent swelling, requesting appt for F/U.    Dr. Kowalski- please review for MRI order, F/U appt.  FRANCISCA Bedoya RN        
Pt informed/ advised as noted per MD.  States will call when having recurrent arm swelling.  Informed MRI ordered, gave number to schedule appt.  FRANCISCA Bedoya RN    
Reason for Call:  Other call back    Detailed comments: Patient called to schedule appt to read results from recent test and would like to discuss issue with swelling in arm.    Phone Number Patient can be reached at: Home number on file [phone] (home)    Best Time: Any    Can we leave a detailed message on this number? YES    Call taken on 9/28/2022 at 2:53 PM by NUBIA CROCKETT    
no pedal edema

## 2022-10-14 ENCOUNTER — OUTPATIENT (OUTPATIENT)
Dept: OUTPATIENT SERVICES | Facility: HOSPITAL | Age: 52
LOS: 1 days | End: 2022-10-14
Payer: COMMERCIAL

## 2022-10-14 ENCOUNTER — APPOINTMENT (OUTPATIENT)
Dept: MAMMOGRAPHY | Facility: IMAGING CENTER | Age: 52
End: 2022-10-14

## 2022-10-14 ENCOUNTER — APPOINTMENT (OUTPATIENT)
Dept: ULTRASOUND IMAGING | Facility: IMAGING CENTER | Age: 52
End: 2022-10-14

## 2022-10-14 DIAGNOSIS — C50.919 MALIGNANT NEOPLASM OF UNSPECIFIED SITE OF UNSPECIFIED FEMALE BREAST: Chronic | ICD-10-CM

## 2022-10-14 DIAGNOSIS — Z98.89 OTHER SPECIFIED POSTPROCEDURAL STATES: Chronic | ICD-10-CM

## 2022-10-14 DIAGNOSIS — Z00.8 ENCOUNTER FOR OTHER GENERAL EXAMINATION: ICD-10-CM

## 2022-10-14 PROCEDURE — 76641 ULTRASOUND BREAST COMPLETE: CPT | Mod: 26,50

## 2022-10-14 PROCEDURE — 76641 ULTRASOUND BREAST COMPLETE: CPT

## 2022-10-14 PROCEDURE — 77067 SCR MAMMO BI INCL CAD: CPT | Mod: 26

## 2022-10-14 PROCEDURE — 77063 BREAST TOMOSYNTHESIS BI: CPT | Mod: 26

## 2022-10-14 PROCEDURE — 77067 SCR MAMMO BI INCL CAD: CPT

## 2022-10-14 PROCEDURE — 77063 BREAST TOMOSYNTHESIS BI: CPT

## 2023-01-20 ENCOUNTER — APPOINTMENT (OUTPATIENT)
Dept: ORTHOPEDIC SURGERY | Facility: CLINIC | Age: 53
End: 2023-01-20
Payer: OTHER MISCELLANEOUS

## 2023-01-20 ENCOUNTER — NON-APPOINTMENT (OUTPATIENT)
Age: 53
End: 2023-01-20

## 2023-01-20 VITALS
HEART RATE: 76 BPM | HEIGHT: 62.5 IN | WEIGHT: 176 LBS | BODY MASS INDEX: 31.58 KG/M2 | DIASTOLIC BLOOD PRESSURE: 85 MMHG | SYSTOLIC BLOOD PRESSURE: 123 MMHG

## 2023-01-20 DIAGNOSIS — S60.221A CONTUSION OF RIGHT HAND, INITIAL ENCOUNTER: ICD-10-CM

## 2023-01-20 DIAGNOSIS — S60.211A CONTUSION OF RIGHT WRIST, INITIAL ENCOUNTER: ICD-10-CM

## 2023-01-20 DIAGNOSIS — M79.641 PAIN IN RIGHT HAND: ICD-10-CM

## 2023-01-20 PROCEDURE — 99204 OFFICE O/P NEW MOD 45 MIN: CPT

## 2023-01-20 PROCEDURE — 73110 X-RAY EXAM OF WRIST: CPT | Mod: RT

## 2023-01-20 PROCEDURE — 99072 ADDL SUPL MATRL&STAF TM PHE: CPT

## 2023-01-20 PROCEDURE — 73130 X-RAY EXAM OF HAND: CPT | Mod: RT

## 2023-01-20 RX ORDER — MELOXICAM 7.5 MG/1
7.5 TABLET ORAL
Qty: 20 | Refills: 0 | Status: ACTIVE | COMMUNITY
Start: 2023-01-20 | End: 1900-01-01

## 2023-01-20 NOTE — ADDENDUM
[FreeTextEntry1] : I, Jayla Lopez, acted solely as a scribe for Dr. Sinclair on this date on 01/20/2023.		\par

## 2023-01-20 NOTE — END OF VISIT
[FreeTextEntry3] : This note was written by Jayla Lopez on 01/20/2023  acting solely as a scribe for Dr. Kevin Sinclair.\par  \par All medical record entries made by the Scribe were at my, Dr. Kevin Sinclair, direction and personally dictated by me on 01/20/2023. I have personally reviewed the chart and agree that the record accurately reflects my personal performance of the history, physical exam, assessment and plan.

## 2023-01-20 NOTE — HISTORY OF PRESENT ILLNESS
[Right] : right hand dominant [Has the patient missed work because of the injury/illness?] : The patient has missed work because of the injury/illness. [Yes] : The patient is currently working. [FreeTextEntry1] : Workmen's Compensation case\par Date of accident: 07/22/2022\par Working: Yes\par Degree of disability: 25%\par \par She comes in today for evaluation of the right wrist and hand. During her shift, a coworker accidental dropped an electrical stapler on her right hand. She states she had been seen by Dr. Segura, and was told she had nerve damage. She states had an MRI of her right hand and wrist which was reviewed by Dr. Segura. She reports numbness and tingling. Her hand is swollen and is painful. The pain worsens when she uses her hand. Currently her pain level is a 7/10. 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

## 2023-01-20 NOTE — DISCUSSION/SUMMARY
[FreeTextEntry1] : She has findings consistent with a right wrist and hand contusion with persistent symptoms and swelling after an injury at work on 7/22/2022.\par \par I had a discussion with the patient regarding today's visit, the prognosis of this diagnosis, and treatment recommendations and options. At this time, I told her that it would be helpful if I can review the MRI results and her EMGs.  She told me that she will speak to her physician who ordered them to get the results.  In the meantime, she was started on a course of meloxicam 7.5 mg a day.  She will follow-up after she has the studies so that I can review them with her to discuss treatment recommendations.\par \par The patient has agreed to the above plan of management and has expressed full understanding.  All questions were fully answered to the patient's satisfaction.\par \par My cumulative time spent on this visit included: Preparation for the visit, review of the medical records, review of pertinent diagnostic studies, examination and counseling of the patient on the above diagnosis, treatment plan and prognosis, orders of diagnostic tests, medication and/or appropriate procedures and documentation in the medical records of today's visit.

## 2023-04-12 ENCOUNTER — APPOINTMENT (OUTPATIENT)
Dept: OBGYN | Facility: CLINIC | Age: 53
End: 2023-04-12
Payer: COMMERCIAL

## 2023-04-12 VITALS
SYSTOLIC BLOOD PRESSURE: 130 MMHG | HEIGHT: 62.5 IN | HEART RATE: 97 BPM | BODY MASS INDEX: 32.12 KG/M2 | DIASTOLIC BLOOD PRESSURE: 85 MMHG | WEIGHT: 179 LBS

## 2023-04-12 DIAGNOSIS — Z87.42 PERSONAL HISTORY OF OTHER DISEASES OF THE FEMALE GENITAL TRACT: ICD-10-CM

## 2023-04-12 DIAGNOSIS — N84.0 POLYP OF CORPUS UTERI: ICD-10-CM

## 2023-04-12 DIAGNOSIS — Z01.419 ENCOUNTER FOR GYNECOLOGICAL EXAMINATION (GENERAL) (ROUTINE) W/OUT ABNORMAL FINDINGS: ICD-10-CM

## 2023-04-12 PROCEDURE — 99396 PREV VISIT EST AGE 40-64: CPT

## 2023-04-12 NOTE — HISTORY OF PRESENT ILLNESS
[Good] : being in good health [Last Mammogram ___] : Last Mammogram was [unfilled] [Last Pap ___] : Last cervical pap smear was [unfilled] [Postmenopausal] : is postmenopausal [HPV Vaccine NA Due to Age] : HPV vaccine not available to patient due to age [Mammogramdate] : emr [BreastSonogramDate] : emr [PapSmeardate] : emr [BoneDensityDate] : 2021 [ColonoscopyDate] : emr

## 2023-04-14 LAB — HPV HIGH+LOW RISK DNA PNL CVX: NOT DETECTED

## 2023-04-16 LAB — CYTOLOGY CVX/VAG DOC THIN PREP: NORMAL

## 2023-10-02 DIAGNOSIS — S10.95XA SUPERFICIAL FOREIGN BODY OF UNSPECIFIED PART OF NECK, INITIAL ENCOUNTER: ICD-10-CM

## 2023-10-03 ENCOUNTER — OUTPATIENT (OUTPATIENT)
Dept: OUTPATIENT SERVICES | Facility: HOSPITAL | Age: 53
LOS: 1 days | End: 2023-10-03
Payer: COMMERCIAL

## 2023-10-03 ENCOUNTER — APPOINTMENT (OUTPATIENT)
Dept: CT IMAGING | Facility: IMAGING CENTER | Age: 53
End: 2023-10-03
Payer: COMMERCIAL

## 2023-10-03 DIAGNOSIS — Z98.89 OTHER SPECIFIED POSTPROCEDURAL STATES: Chronic | ICD-10-CM

## 2023-10-03 DIAGNOSIS — S10.95XA SUPERFICIAL FOREIGN BODY OF UNSPECIFIED PART OF NECK, INITIAL ENCOUNTER: ICD-10-CM

## 2023-10-03 DIAGNOSIS — C50.919 MALIGNANT NEOPLASM OF UNSPECIFIED SITE OF UNSPECIFIED FEMALE BREAST: Chronic | ICD-10-CM

## 2023-10-03 DIAGNOSIS — Z00.8 ENCOUNTER FOR OTHER GENERAL EXAMINATION: ICD-10-CM

## 2023-10-03 PROCEDURE — 70490 CT SOFT TISSUE NECK W/O DYE: CPT

## 2023-10-03 PROCEDURE — 70490 CT SOFT TISSUE NECK W/O DYE: CPT | Mod: 26

## 2023-10-25 ENCOUNTER — APPOINTMENT (OUTPATIENT)
Dept: OTOLARYNGOLOGY | Facility: CLINIC | Age: 53
End: 2023-10-25
Payer: COMMERCIAL

## 2023-10-25 VITALS
WEIGHT: 179 LBS | SYSTOLIC BLOOD PRESSURE: 148 MMHG | BODY MASS INDEX: 32.12 KG/M2 | HEART RATE: 86 BPM | HEIGHT: 62.5 IN | DIASTOLIC BLOOD PRESSURE: 83 MMHG

## 2023-10-25 DIAGNOSIS — R07.0 PAIN IN THROAT: ICD-10-CM

## 2023-10-25 PROCEDURE — 99204 OFFICE O/P NEW MOD 45 MIN: CPT | Mod: 25

## 2023-10-25 PROCEDURE — 31575 DIAGNOSTIC LARYNGOSCOPY: CPT

## 2023-10-28 ENCOUNTER — OUTPATIENT (OUTPATIENT)
Dept: OUTPATIENT SERVICES | Facility: HOSPITAL | Age: 53
LOS: 1 days | End: 2023-10-28
Payer: COMMERCIAL

## 2023-10-28 ENCOUNTER — APPOINTMENT (OUTPATIENT)
Dept: ULTRASOUND IMAGING | Facility: IMAGING CENTER | Age: 53
End: 2023-10-28
Payer: COMMERCIAL

## 2023-10-28 ENCOUNTER — APPOINTMENT (OUTPATIENT)
Dept: MAMMOGRAPHY | Facility: IMAGING CENTER | Age: 53
End: 2023-10-28
Payer: COMMERCIAL

## 2023-10-28 DIAGNOSIS — C50.919 MALIGNANT NEOPLASM OF UNSPECIFIED SITE OF UNSPECIFIED FEMALE BREAST: Chronic | ICD-10-CM

## 2023-10-28 DIAGNOSIS — Z00.8 ENCOUNTER FOR OTHER GENERAL EXAMINATION: ICD-10-CM

## 2023-10-28 DIAGNOSIS — Z98.89 OTHER SPECIFIED POSTPROCEDURAL STATES: Chronic | ICD-10-CM

## 2023-10-28 PROCEDURE — 77063 BREAST TOMOSYNTHESIS BI: CPT | Mod: 26

## 2023-10-28 PROCEDURE — 77067 SCR MAMMO BI INCL CAD: CPT

## 2023-10-28 PROCEDURE — 77067 SCR MAMMO BI INCL CAD: CPT | Mod: 26

## 2023-10-28 PROCEDURE — 76641 ULTRASOUND BREAST COMPLETE: CPT | Mod: 26,50

## 2023-10-28 PROCEDURE — 77063 BREAST TOMOSYNTHESIS BI: CPT

## 2023-10-28 PROCEDURE — 76641 ULTRASOUND BREAST COMPLETE: CPT

## 2024-07-31 ENCOUNTER — EMERGENCY (EMERGENCY)
Facility: HOSPITAL | Age: 54
LOS: 1 days | Discharge: HOME | End: 2024-07-31
Attending: EMERGENCY MEDICINE | Admitting: EMERGENCY MEDICINE
Payer: COMMERCIAL

## 2024-07-31 VITALS
RESPIRATION RATE: 16 BRPM | HEART RATE: 81 BPM | SYSTOLIC BLOOD PRESSURE: 147 MMHG | TEMPERATURE: 98 F | DIASTOLIC BLOOD PRESSURE: 82 MMHG | OXYGEN SATURATION: 99 %

## 2024-07-31 DIAGNOSIS — Z98.89 OTHER SPECIFIED POSTPROCEDURAL STATES: Chronic | ICD-10-CM

## 2024-07-31 DIAGNOSIS — C50.919 MALIGNANT NEOPLASM OF UNSPECIFIED SITE OF UNSPECIFIED FEMALE BREAST: Chronic | ICD-10-CM

## 2024-07-31 PROCEDURE — 93970 EXTREMITY STUDY: CPT | Mod: 26

## 2024-07-31 PROCEDURE — 99284 EMERGENCY DEPT VISIT MOD MDM: CPT

## 2024-07-31 RX ADMIN — Medication 800 MILLIGRAM(S): at 09:43

## 2024-07-31 NOTE — ED ADULT NURSE NOTE - PRIMARY CARE PROVIDER
Patient: Nisha Lou    Procedure Summary     Date: 12/22/21 Room / Location:  [facility] 08 /  [facility]    Anesthesia Start: 0655 Anesthesia Stop: 0730    Procedure: CARPAL TUNNEL RELEASE LEFT (Left Wrist) Diagnosis:       Carpal tunnel syndrome of left wrist      (Carpal tunnel syndrome of left wrist [G56.02])    Surgeons: Oneal Andino MD Provider: Harvey Camejo MD    Anesthesia Type: general ASA Status: 2          Anesthesia Type: general    Vitals  No vitals data found for the desired time range.          Post Anesthesia Care and Evaluation    Patient location during evaluation: PACU  Patient participation: complete - patient participated  Level of consciousness: awake and alert  Pain management: adequate  Airway patency: patent  Anesthetic complications: No anesthetic complications  PONV Status: none  Cardiovascular status: hemodynamically stable and acceptable  Respiratory status: nonlabored ventilation, acceptable and nasal cannula  Hydration status: acceptable       MD Acosta

## 2024-07-31 NOTE — ED PROVIDER NOTE - NSICDXPASTSURGICALHX_GEN_ALL_CORE_FT
PAST SURGICAL HISTORY:  Breast cancer insertion of iloxy-c-gtky and then removed after chemotherapy treatment    Ectopic Pregnancy Left Salpingectomy 2007    H/O arthroscopic knee surgery     History of Dilatation and Curettage x 3    S/P Breast Biopsy, Bilateral     S/P Breast Lumpectomy left 2009

## 2024-07-31 NOTE — ED PROVIDER NOTE - PROGRESS NOTE DETAILS
LE venous duplex negative for DVT.  Patient reassessed.  Feels better with pain med.  Results discussed. Discussed need for repeat duplex u/s in 1 week to complete assessment for DVT.  Patient plans to f/u with PMD Dr. Acosta on Friday.  Stable for discharge.  Provided work note.

## 2024-07-31 NOTE — ED ADULT NURSE NOTE - NSICDXPASTSURGICALHX_GEN_ALL_CORE_FT
PAST SURGICAL HISTORY:  Breast cancer insertion of [id number] and then removed after chemotherapy treatment    Ectopic Pregnancy Left Salpingectomy 2007    H/O arthroscopic knee surgery     History of Dilatation and Curettage x 3    S/P Breast Biopsy, Bilateral     S/P Breast Lumpectomy left 2009

## 2024-07-31 NOTE — ED PROVIDER NOTE - CLINICAL SUMMARY MEDICAL DECISION MAKING FREE TEXT BOX
55 y/o F c/o b/l lower extremity edema x 1 year with increased right calf pain x 2 days.    Must consider DVT as etiology for pain and swelling.  No other signs c/w card or hepatic etiology.  Could be side effect from amlodipine.  Will check LE duplex.  Pain med.  re-eval.

## 2024-07-31 NOTE — ED ADULT TRIAGE NOTE - CHIEF COMPLAINT QUOTE
Pt endorses rt calf pain progressing over the past 2 days. No redness or warmness to touch noted to rt extremity, pulses palpable, pt endorses long distance driving over the week, no shortness of breath, afebrile.

## 2024-07-31 NOTE — ED PROVIDER NOTE - OBJECTIVE STATEMENT
55 y/o F [employer] employee, [place] in [place].  Reports lots of sitting and standing which leads to b/l lower extremity edema for past 1 year or longer.  Typically wears compression stockings as has pain from swelling if she doesn't wear.  Reports long car drive on Saturday.  Since Monday having increased pain in right calf.  No fall or direct trauma to lower extremity.  Otherwise feels well without any recent fever, cough, shortness of breath, chest pain.  PMH - breast CA, HTN  Meds - amlodipine, spironolactone, ASA

## 2024-07-31 NOTE — ED PROVIDER NOTE - PATIENT PORTAL LINK FT
You can access the FollowMyHealth Patient Portal offered by [facility] by registering at the following website: [email or web address]. By joining [facility]’s FollowMyHealth portal, you will also be able to view your health information using other applications (apps) compatible with our system.

## 2024-07-31 NOTE — ED PROVIDER NOTE - NSFOLLOWUPINSTRUCTIONS_ED_ALL_ED_FT
Follow up with your PMD within 48-72 hrs. Show copies of your reports given to you. Take all of your medications as previously prescribed.   Discuss with your doctor the need for a second duplex ultrasound study.  Discuss with your doctor the consideration that amlodipine causing lower extremity swelling.  Return to ER for return of pain or any further concern.

## 2024-07-31 NOTE — ED ADULT NURSE NOTE - OBJECTIVE STATEMENT
a&ox3, respirations even and unlabored, steady on her feet, c/o rt calf pain and swelling worsening since Monday after long drive. pain med given. waiting for US eval

## 2024-07-31 NOTE — ED PROVIDER NOTE - PHYSICAL EXAMINATION
ATTENDING PHYSICAL EXAM  GEN - NAD; well appearing; A+O x3, ambulating comfortably  HEAD - NC/AT  NECK: Neck supple, no JVD  PULMONARY - CTA b/l, symmetric breath sounds, no w/r/r  CARDIAC -s1s2, RRR, no M,R,G  ABDOMEN - +NABS, ND, NT, soft  BACK - no CVA tenderness, No vertebral or paravertebral tenderness  EXTREMITIES - b/l lower extremities with 1+ anterior tibial area edema.  Distal PMS fully intact.  b/l knees, ankles, and feet with FROM, nontender.  + right calf TTP (Homans).  SKIN - no rash or bruising   NEUROLOGIC - Motor 5/5 throughout b/l LEs

## 2024-10-29 ENCOUNTER — OUTPATIENT (OUTPATIENT)
Dept: OUTPATIENT SERVICES | Facility: HOSPITAL | Age: 54
LOS: 1 days | End: 2024-10-29
Payer: COMMERCIAL

## 2024-10-29 ENCOUNTER — APPOINTMENT (OUTPATIENT)
Dept: MAMMOGRAPHY | Facility: IMAGING CENTER | Age: 54
End: 2024-10-29
Payer: COMMERCIAL

## 2024-10-29 ENCOUNTER — APPOINTMENT (OUTPATIENT)
Dept: ULTRASOUND IMAGING | Facility: IMAGING CENTER | Age: 54
End: 2024-10-29
Payer: COMMERCIAL

## 2024-10-29 DIAGNOSIS — Z00.8 ENCOUNTER FOR OTHER GENERAL EXAMINATION: ICD-10-CM

## 2024-10-29 DIAGNOSIS — C50.919 MALIGNANT NEOPLASM OF UNSPECIFIED SITE OF UNSPECIFIED FEMALE BREAST: Chronic | ICD-10-CM

## 2024-10-29 PROCEDURE — 76641 ULTRASOUND BREAST COMPLETE: CPT

## 2024-10-29 PROCEDURE — 77067 SCR MAMMO BI INCL CAD: CPT | Mod: 26

## 2024-10-29 PROCEDURE — 76641 ULTRASOUND BREAST COMPLETE: CPT | Mod: 26,50

## 2024-10-29 PROCEDURE — 77063 BREAST TOMOSYNTHESIS BI: CPT | Mod: 26

## 2024-10-29 PROCEDURE — 77063 BREAST TOMOSYNTHESIS BI: CPT

## 2024-10-29 PROCEDURE — 77067 SCR MAMMO BI INCL CAD: CPT

## 2025-06-16 ENCOUNTER — TRANSCRIPTION ENCOUNTER (OUTPATIENT)
Age: 55
End: 2025-06-16

## 2025-09-03 ENCOUNTER — NON-APPOINTMENT (OUTPATIENT)
Age: 55
End: 2025-09-03

## 2025-09-03 ENCOUNTER — APPOINTMENT (OUTPATIENT)
Dept: OBGYN | Facility: CLINIC | Age: 55
End: 2025-09-03
Payer: COMMERCIAL

## 2025-09-03 VITALS
DIASTOLIC BLOOD PRESSURE: 84 MMHG | WEIGHT: 181 LBS | BODY MASS INDEX: 32.47 KG/M2 | HEIGHT: 62.5 IN | HEART RATE: 93 BPM | SYSTOLIC BLOOD PRESSURE: 129 MMHG

## 2025-09-03 DIAGNOSIS — R93.89 ABNORMAL FINDINGS ON DIAGNOSTIC IMAGING OF OTHER SPECIFIED BODY STRUCTURES: ICD-10-CM

## 2025-09-03 DIAGNOSIS — Z01.419 ENCOUNTER FOR GYNECOLOGICAL EXAMINATION (GENERAL) (ROUTINE) W/OUT ABNORMAL FINDINGS: ICD-10-CM

## 2025-09-03 DIAGNOSIS — Z87.42 PERSONAL HISTORY OF OTHER DISEASES OF THE FEMALE GENITAL TRACT: ICD-10-CM

## 2025-09-03 PROCEDURE — 99459 PELVIC EXAMINATION: CPT

## 2025-09-03 PROCEDURE — 99396 PREV VISIT EST AGE 40-64: CPT

## 2025-09-04 LAB — HPV HIGH+LOW RISK DNA PNL CVX: NOT DETECTED

## 2025-09-08 LAB — CYTOLOGY CVX/VAG DOC THIN PREP: NORMAL

## (undated) DEVICE — GOWN XL

## (undated) DEVICE — CABLE DAC ACTIVE CORD

## (undated) DEVICE — ELCTR CUTTING LOOP RIGHT ANGLE 24FR

## (undated) DEVICE — WARMING BLANKET FULL ADULT

## (undated) DEVICE — VENODYNE/SCD SLEEVE CALF MEDIUM

## (undated) DEVICE — POSITIONER STRAP ARMBOARD VELCRO TS-30

## (undated) DEVICE — LABELS BLANK W PEN

## (undated) DEVICE — GOWN LG

## (undated) DEVICE — TUBING SUCTION NONCONDUCTIVE 6MM X 12FT

## (undated) DEVICE — PRESSURE INFUSOR BAG 1000ML

## (undated) DEVICE — ELCTR REM POLYHESIVE ADULT PT RETURN 15FT

## (undated) DEVICE — DRAPE UNDER BUTTOCKS W SCREEN

## (undated) DEVICE — BASIN SET DOUBLE

## (undated) DEVICE — SYMPHION FLUID MANAGEMENT DEVICE

## (undated) DEVICE — TUBING IRR SET FOR CYSTOSCOPY 77"

## (undated) DEVICE — VISITEC 4X4

## (undated) DEVICE — DRAPE LIGHT HANDLE COVER (GREEN)

## (undated) DEVICE — DRSG TELFA 3 X 8

## (undated) DEVICE — GLV 7 PROTEXIS (WHITE)

## (undated) DEVICE — PACK PERI GYN

## (undated) DEVICE — SOL IRR POUR NS 0.9% 1000ML

## (undated) DEVICE — DRAPE IRRIGATION POUCH 19X23"

## (undated) DEVICE — SYMPHION 3.6MM RESECTING DEVICE

## (undated) DEVICE — DRAPE TOWEL BLUE 17" X 24"

## (undated) DEVICE — TUBING STRYKER HYSTEROSCOPY INFLOW OUTFLOW

## (undated) DEVICE — SOL IRR BAG NS 0.9% 3000ML

## (undated) DEVICE — PREP BETADINE SPONGE STICKS

## (undated) DEVICE — DRSG PAD SANITARY OB

## (undated) DEVICE — PROTECTOR HEEL / ELBOW FLUFFY